# Patient Record
Sex: FEMALE | Race: OTHER | NOT HISPANIC OR LATINO
[De-identification: names, ages, dates, MRNs, and addresses within clinical notes are randomized per-mention and may not be internally consistent; named-entity substitution may affect disease eponyms.]

---

## 2017-01-09 ENCOUNTER — RESULT REVIEW (OUTPATIENT)
Age: 47
End: 2017-01-09

## 2017-02-22 ENCOUNTER — APPOINTMENT (OUTPATIENT)
Dept: BREAST CENTER | Facility: CLINIC | Age: 47
End: 2017-02-22

## 2017-08-09 ENCOUNTER — INPATIENT (INPATIENT)
Facility: HOSPITAL | Age: 47
LOS: 2 days | Discharge: HOME | DRG: 744 | End: 2017-08-12
Attending: OBSTETRICS & GYNECOLOGY | Admitting: OBSTETRICS & GYNECOLOGY
Payer: COMMERCIAL

## 2017-08-09 VITALS
WEIGHT: 199.96 LBS | RESPIRATION RATE: 18 BRPM | DIASTOLIC BLOOD PRESSURE: 65 MMHG | HEART RATE: 108 BPM | SYSTOLIC BLOOD PRESSURE: 112 MMHG | TEMPERATURE: 99 F | HEIGHT: 65 IN

## 2017-08-09 DIAGNOSIS — Z98.890 OTHER SPECIFIED POSTPROCEDURAL STATES: Chronic | ICD-10-CM

## 2017-08-09 LAB
ALBUMIN SERPL ELPH-MCNC: 3.4 G/DL — SIGNIFICANT CHANGE UP (ref 3.3–5)
ALP SERPL-CCNC: 120 U/L — SIGNIFICANT CHANGE UP (ref 40–120)
ALT FLD-CCNC: 20 U/L — SIGNIFICANT CHANGE UP (ref 10–45)
ANION GAP SERPL CALC-SCNC: 13 MMOL/L — SIGNIFICANT CHANGE UP (ref 5–17)
APPEARANCE UR: CLEAR — SIGNIFICANT CHANGE UP
APTT BLD: 25.3 SEC — LOW (ref 27.5–37.4)
AST SERPL-CCNC: 16 U/L — SIGNIFICANT CHANGE UP (ref 10–40)
BILIRUB SERPL-MCNC: 0.3 MG/DL — SIGNIFICANT CHANGE UP (ref 0.2–1.2)
BILIRUB UR-MCNC: NEGATIVE — SIGNIFICANT CHANGE UP
BLD GP AB SCN SERPL QL: NEGATIVE — SIGNIFICANT CHANGE UP
BUN SERPL-MCNC: 12 MG/DL — SIGNIFICANT CHANGE UP (ref 7–23)
CALCIUM SERPL-MCNC: 8.6 MG/DL — SIGNIFICANT CHANGE UP (ref 8.4–10.5)
CHLORIDE SERPL-SCNC: 98 MMOL/L — SIGNIFICANT CHANGE UP (ref 96–108)
CO2 SERPL-SCNC: 24 MMOL/L — SIGNIFICANT CHANGE UP (ref 22–31)
COLOR SPEC: YELLOW — SIGNIFICANT CHANGE UP
CREAT SERPL-MCNC: 1 MG/DL — SIGNIFICANT CHANGE UP (ref 0.5–1.3)
DIFF PNL FLD: (no result)
GLUCOSE SERPL-MCNC: 109 MG/DL — HIGH (ref 70–99)
GLUCOSE UR QL: NEGATIVE — SIGNIFICANT CHANGE UP
HCT VFR BLD CALC: 22.4 % — LOW (ref 34.5–45)
HCT VFR BLD CALC: 26.6 % — LOW (ref 34.5–45)
HGB BLD-MCNC: 6.8 G/DL — CRITICAL LOW (ref 11.5–15.5)
HGB BLD-MCNC: 8.6 G/DL — LOW (ref 11.5–15.5)
INR BLD: 0.97 — SIGNIFICANT CHANGE UP (ref 0.88–1.16)
KETONES UR-MCNC: (no result) MG/DL
LEUKOCYTE ESTERASE UR-ACNC: NEGATIVE — SIGNIFICANT CHANGE UP
MCHC RBC-ENTMCNC: 20.3 PG — LOW (ref 27–34)
MCHC RBC-ENTMCNC: 22.7 PG — LOW (ref 27–34)
MCHC RBC-ENTMCNC: 30.4 G/DL — LOW (ref 32–36)
MCHC RBC-ENTMCNC: 32.3 G/DL — SIGNIFICANT CHANGE UP (ref 32–36)
MCV RBC AUTO: 66.9 FL — LOW (ref 80–100)
MCV RBC AUTO: 70.2 FL — LOW (ref 80–100)
NITRITE UR-MCNC: NEGATIVE — SIGNIFICANT CHANGE UP
PH UR: 5.5 — SIGNIFICANT CHANGE UP (ref 5–8)
PLATELET # BLD AUTO: 470 K/UL — HIGH (ref 150–400)
PLATELET # BLD AUTO: 522 K/UL — HIGH (ref 150–400)
POTASSIUM SERPL-MCNC: 3.7 MMOL/L — SIGNIFICANT CHANGE UP (ref 3.5–5.3)
POTASSIUM SERPL-SCNC: 3.7 MMOL/L — SIGNIFICANT CHANGE UP (ref 3.5–5.3)
PROT SERPL-MCNC: 7.2 G/DL — SIGNIFICANT CHANGE UP (ref 6–8.3)
PROT UR-MCNC: NEGATIVE MG/DL — SIGNIFICANT CHANGE UP
PROTHROM AB SERPL-ACNC: 10.8 SEC — SIGNIFICANT CHANGE UP (ref 9.8–12.7)
RBC # BLD: 3.35 M/UL — LOW (ref 3.8–5.2)
RBC # BLD: 3.79 M/UL — LOW (ref 3.8–5.2)
RBC # FLD: 17.7 % — HIGH (ref 10.3–16.9)
RBC # FLD: 19.4 % — HIGH (ref 10.3–16.9)
RH IG SCN BLD-IMP: POSITIVE — SIGNIFICANT CHANGE UP
RH IG SCN BLD-IMP: POSITIVE — SIGNIFICANT CHANGE UP
SODIUM SERPL-SCNC: 135 MMOL/L — SIGNIFICANT CHANGE UP (ref 135–145)
SP GR SPEC: 1.02 — SIGNIFICANT CHANGE UP (ref 1–1.03)
TSH SERPL-MCNC: 2.12 UIU/ML — SIGNIFICANT CHANGE UP (ref 0.35–4.94)
UROBILINOGEN FLD QL: 0.2 E.U./DL — SIGNIFICANT CHANGE UP
WBC # BLD: 9 K/UL — SIGNIFICANT CHANGE UP (ref 3.8–10.5)
WBC # BLD: 9.3 K/UL — SIGNIFICANT CHANGE UP (ref 3.8–10.5)
WBC # FLD AUTO: 9 K/UL — SIGNIFICANT CHANGE UP (ref 3.8–10.5)
WBC # FLD AUTO: 9.3 K/UL — SIGNIFICANT CHANGE UP (ref 3.8–10.5)

## 2017-08-09 PROCEDURE — 76830 TRANSVAGINAL US NON-OB: CPT | Mod: 26

## 2017-08-09 PROCEDURE — 99285 EMERGENCY DEPT VISIT HI MDM: CPT | Mod: 25

## 2017-08-09 PROCEDURE — 76856 US EXAM PELVIC COMPLETE: CPT | Mod: 26

## 2017-08-09 RX ORDER — DIPHENHYDRAMINE HCL 50 MG
50 CAPSULE ORAL EVERY 6 HOURS
Qty: 0 | Refills: 0 | Status: DISCONTINUED | OUTPATIENT
Start: 2017-08-09 | End: 2017-08-10

## 2017-08-09 RX ORDER — SODIUM CHLORIDE 9 MG/ML
1000 INJECTION, SOLUTION INTRAVENOUS
Qty: 0 | Refills: 0 | Status: DISCONTINUED | OUTPATIENT
Start: 2017-08-09 | End: 2017-08-09

## 2017-08-09 NOTE — H&P ADULT - ATTENDING COMMENTS
PT SEEN AND EXAMINED.  I AGREE WITH ASSESSMENT AND PLAN AS DOCUMENTED.  DIAGNOSTIC TESTING AND LONG TERM MANAGEMENT OF DUB DISCUSSED.  PT CONSIDERING THERAPEUTIC OPTIONS.  WILL EVALUATE SYMPTOMS AFTER FIRST UNIT OF BLOOD.  IF STILL SYMPTOMATIC WILL ADMINISTER UNIT # 2.

## 2017-08-09 NOTE — ED ADULT NURSE NOTE - OBJECTIVE STATEMENT
Patient to the ED for heavy vaginal bleed associated with large clots beginning 6/26/17.  Patient reported that bleeding had lessened becoming pink and presently has worsened where clots are large and bleeding is extremely heavy saturating 10 to 15 tampon/pads in a small space of time.  Patient denies any abdominal pain, burning /pain on urination.

## 2017-08-09 NOTE — CONSULT NOTE ADULT - SUBJECTIVE AND OBJECTIVE BOX
46 yo  presents for heavy vaginal bleeding for several days. Patient reports irregular periods since January, previously had normal cycles. Bleeding was intermittent, irreg, light until recently when it became heavy, passing large dark stringy clots. Yesterday, she became dizzy, lightheaded, and had dark spots in her vision. Denies palpitations, SOB, nausea/vomiting, fever/chills. Hgb in February was 11. Patient has never had a pelvic sonogram, has no hx of fibroids, polyps, STDs.  Obhx: 1 FT , 1 MAB w/ D&C, VTOP D&C  GynHx: regular menses until January, then became irreg, no hx of STDs, fibroids, cysts, polyps  PMH: Asthma  PSH: D&C x2, sinus surgery  Meds: albuterol prn  NKDA  Fam Hx: mother had 53 fibroids    Afebrile  /65   Gen: NAD, AAOx3  Chest: normal work of breathing  Abdomen: soft, nontender, nondistended  Pelvic: normal cervix, <10 cc dark red blood                          6.8    9.0   )-----------( 522      ( 09 Aug 2017 05:26 )             22.4

## 2017-08-09 NOTE — ED PROVIDER NOTE - ATTENDING CONTRIBUTION TO CARE
pt seen and examined by me, agree with above. 48 yo female with persistent menses x 1 month, started feeling lightheaded, dizzy, past few days.  on exam, pt in nad, heart and lungs normal, abd soft nt.  hgb is low, will be transfused for symptomatic anemia, admitted to gyn

## 2017-08-09 NOTE — ED PROVIDER NOTE - MEDICAL DECISION MAKING DETAILS
GYN team with patient in ED ( aware was coming to ED ) GYN team with patient in ED ( aware was coming to ED ) + symptomatic anemia and per gyn requiring transfusion

## 2017-08-09 NOTE — ED PROVIDER NOTE - OBJECTIVE STATEMENT
persistent vaginal bleeding last two month albeit intermittent in nature + initially chalked up to perimenopausal but now concerned as some lightheadedness today along with clots Denies pain

## 2017-08-10 ENCOUNTER — RESULT REVIEW (OUTPATIENT)
Age: 47
End: 2017-08-10

## 2017-08-10 ENCOUNTER — TRANSCRIPTION ENCOUNTER (OUTPATIENT)
Age: 47
End: 2017-08-10

## 2017-08-10 LAB
HCT VFR BLD CALC: 27.6 % — LOW (ref 34.5–45)
HCT VFR BLD CALC: 30.8 % — LOW (ref 34.5–45)
HGB BLD-MCNC: 8.7 G/DL — LOW (ref 11.5–15.5)
HGB BLD-MCNC: 9.9 G/DL — LOW (ref 11.5–15.5)
MCHC RBC-ENTMCNC: 22.3 PG — LOW (ref 27–34)
MCHC RBC-ENTMCNC: 23 PG — LOW (ref 27–34)
MCHC RBC-ENTMCNC: 31.5 G/DL — LOW (ref 32–36)
MCHC RBC-ENTMCNC: 32.1 G/DL — SIGNIFICANT CHANGE UP (ref 32–36)
MCV RBC AUTO: 70.6 FL — LOW (ref 80–100)
MCV RBC AUTO: 71.6 FL — LOW (ref 80–100)
PLATELET # BLD AUTO: 450 K/UL — HIGH (ref 150–400)
PLATELET # BLD AUTO: 498 K/UL — HIGH (ref 150–400)
RBC # BLD: 3.91 M/UL — SIGNIFICANT CHANGE UP (ref 3.8–5.2)
RBC # BLD: 4.3 M/UL — SIGNIFICANT CHANGE UP (ref 3.8–5.2)
RBC # FLD: 19.5 % — HIGH (ref 10.3–16.9)
RBC # FLD: 19.9 % — HIGH (ref 10.3–16.9)
WBC # BLD: 11.6 K/UL — HIGH (ref 3.8–10.5)
WBC # BLD: 7.9 K/UL — SIGNIFICANT CHANGE UP (ref 3.8–10.5)
WBC # FLD AUTO: 11.6 K/UL — HIGH (ref 3.8–10.5)
WBC # FLD AUTO: 7.9 K/UL — SIGNIFICANT CHANGE UP (ref 3.8–10.5)

## 2017-08-10 RX ORDER — SODIUM CHLORIDE 9 MG/ML
1000 INJECTION, SOLUTION INTRAVENOUS
Qty: 0 | Refills: 0 | Status: DISCONTINUED | OUTPATIENT
Start: 2017-08-10 | End: 2017-08-10

## 2017-08-10 RX ORDER — IBUPROFEN 200 MG
1 TABLET ORAL
Qty: 0 | Refills: 0 | COMMUNITY
Start: 2017-08-10

## 2017-08-10 RX ORDER — KETOROLAC TROMETHAMINE 30 MG/ML
30 SYRINGE (ML) INJECTION ONCE
Qty: 0 | Refills: 0 | Status: DISCONTINUED | OUTPATIENT
Start: 2017-08-10 | End: 2017-08-12

## 2017-08-10 RX ORDER — MORPHINE SULFATE 50 MG/1
4 CAPSULE, EXTENDED RELEASE ORAL
Qty: 0 | Refills: 0 | Status: DISCONTINUED | OUTPATIENT
Start: 2017-08-10 | End: 2017-08-12

## 2017-08-10 RX ORDER — IBUPROFEN 200 MG
600 TABLET ORAL EVERY 6 HOURS
Qty: 0 | Refills: 0 | Status: DISCONTINUED | OUTPATIENT
Start: 2017-08-10 | End: 2017-08-12

## 2017-08-10 RX ORDER — SODIUM CHLORIDE 9 MG/ML
1000 INJECTION, SOLUTION INTRAVENOUS
Qty: 0 | Refills: 0 | Status: DISCONTINUED | OUTPATIENT
Start: 2017-08-10 | End: 2017-08-11

## 2017-08-10 RX ORDER — ONDANSETRON 8 MG/1
4 TABLET, FILM COATED ORAL ONCE
Qty: 0 | Refills: 0 | Status: COMPLETED | OUTPATIENT
Start: 2017-08-10 | End: 2017-08-10

## 2017-08-10 RX ORDER — FERROUS SULFATE 325(65) MG
325 TABLET ORAL EVERY 8 HOURS
Qty: 0 | Refills: 0 | Status: DISCONTINUED | OUTPATIENT
Start: 2017-08-10 | End: 2017-08-12

## 2017-08-10 RX ORDER — HYDROMORPHONE HYDROCHLORIDE 2 MG/ML
0.5 INJECTION INTRAMUSCULAR; INTRAVENOUS; SUBCUTANEOUS ONCE
Qty: 0 | Refills: 0 | Status: DISCONTINUED | OUTPATIENT
Start: 2017-08-10 | End: 2017-08-10

## 2017-08-10 RX ORDER — DOCUSATE SODIUM 100 MG
100 CAPSULE ORAL DAILY
Qty: 0 | Refills: 0 | Status: DISCONTINUED | OUTPATIENT
Start: 2017-08-10 | End: 2017-08-12

## 2017-08-10 RX ORDER — METOCLOPRAMIDE HCL 10 MG
10 TABLET ORAL EVERY 6 HOURS
Qty: 0 | Refills: 0 | Status: DISCONTINUED | OUTPATIENT
Start: 2017-08-10 | End: 2017-08-12

## 2017-08-10 RX ORDER — ACETAMINOPHEN 500 MG
650 TABLET ORAL EVERY 6 HOURS
Qty: 0 | Refills: 0 | Status: DISCONTINUED | OUTPATIENT
Start: 2017-08-10 | End: 2017-08-12

## 2017-08-10 RX ADMIN — HYDROMORPHONE HYDROCHLORIDE 0.5 MILLIGRAM(S): 2 INJECTION INTRAMUSCULAR; INTRAVENOUS; SUBCUTANEOUS at 10:35

## 2017-08-10 RX ADMIN — SODIUM CHLORIDE 125 MILLILITER(S): 9 INJECTION, SOLUTION INTRAVENOUS at 12:42

## 2017-08-10 RX ADMIN — HYDROMORPHONE HYDROCHLORIDE 0.5 MILLIGRAM(S): 2 INJECTION INTRAMUSCULAR; INTRAVENOUS; SUBCUTANEOUS at 10:53

## 2017-08-10 RX ADMIN — Medication 10 MILLIGRAM(S): at 12:24

## 2017-08-10 RX ADMIN — Medication 600 MILLIGRAM(S): at 23:00

## 2017-08-10 RX ADMIN — Medication 100 MILLIGRAM(S): at 13:42

## 2017-08-10 RX ADMIN — MORPHINE SULFATE 4 MILLIGRAM(S): 50 CAPSULE, EXTENDED RELEASE ORAL at 11:00

## 2017-08-10 RX ADMIN — Medication 600 MILLIGRAM(S): at 23:32

## 2017-08-10 RX ADMIN — Medication 325 MILLIGRAM(S): at 22:50

## 2017-08-10 RX ADMIN — ONDANSETRON 4 MILLIGRAM(S): 8 TABLET, FILM COATED ORAL at 20:20

## 2017-08-10 NOTE — DISCHARGE NOTE ADULT - CARE PROVIDER_API CALL
Genevieve Tineo), Obstetrics and Gynecology  [address] [address]  Phone: [phone]  Fax: [phone]

## 2017-08-10 NOTE — DISCHARGE NOTE ADULT - PATIENT PORTAL LINK FT
“You can access the [employer] Patient Portal, offered by [facility], by registering with the following website: [email or web address]”

## 2017-08-10 NOTE — DISCHARGE NOTE ADULT - CARE PLAN
Principal Discharge DX:	Iron deficiency anemia due to chronic blood loss  Goal:	be happy  Instructions for follow-up, activity and diet:	resume activities of daily living, no sexual intercourse, f/u with Dr. Tineo in 1-2 weeks

## 2017-08-10 NOTE — DISCHARGE NOTE ADULT - HOSPITAL COURSE
Pt is a 46 y/o admitted with symptomatic anemia secondary to uterine fibroids. Patient received 3 units of blood with appropriate increase during admission. Hospital day 2 patient went to the OR for diagnostic hysteroscopy and D&C for thickened endometrial lining and suspected endometrial polyp. Uncomplicated surgery. Patient discharged in stable condition with plan for close outpatient monitoring.

## 2017-08-10 NOTE — DISCHARGE NOTE ADULT - MEDICATION SUMMARY - MEDICATIONS TO TAKE
I will START or STAY ON the medications listed below when I get home from the hospital:    ibuprofen 600 mg oral tablet  -- 1 tab(s) by mouth every 6 hours  -- Indication: For Vaginal bleeding

## 2017-08-11 LAB
HCT VFR BLD CALC: 29.5 % — LOW (ref 34.5–45)
HGB BLD-MCNC: 9.4 G/DL — LOW (ref 11.5–15.5)
MCHC RBC-ENTMCNC: 22.9 PG — LOW (ref 27–34)
MCHC RBC-ENTMCNC: 31.9 G/DL — LOW (ref 32–36)
MCV RBC AUTO: 71.8 FL — LOW (ref 80–100)
PLATELET # BLD AUTO: 449 K/UL — HIGH (ref 150–400)
RBC # BLD: 4.11 M/UL — SIGNIFICANT CHANGE UP (ref 3.8–5.2)
RBC # FLD: 20.2 % — HIGH (ref 10.3–16.9)
SURGICAL PATHOLOGY STUDY: SIGNIFICANT CHANGE UP
WBC # BLD: 16.5 K/UL — HIGH (ref 3.8–10.5)
WBC # FLD AUTO: 16.5 K/UL — HIGH (ref 3.8–10.5)

## 2017-08-11 PROCEDURE — 93010 ELECTROCARDIOGRAM REPORT: CPT

## 2017-08-11 RX ORDER — HYDROCORTISONE 1 %
1 OINTMENT (GRAM) TOPICAL EVERY 8 HOURS
Qty: 0 | Refills: 0 | Status: DISCONTINUED | OUTPATIENT
Start: 2017-08-11 | End: 2017-08-12

## 2017-08-11 RX ORDER — ZINC OXIDE 200 MG/G
1 OINTMENT TOPICAL EVERY 8 HOURS
Qty: 0 | Refills: 0 | Status: DISCONTINUED | OUTPATIENT
Start: 2017-08-11 | End: 2017-08-12

## 2017-08-11 RX ORDER — OXYMETAZOLINE HYDROCHLORIDE 0.5 MG/ML
1 SPRAY NASAL EVERY 12 HOURS
Qty: 0 | Refills: 0 | Status: DISCONTINUED | OUTPATIENT
Start: 2017-08-11 | End: 2017-08-12

## 2017-08-11 RX ADMIN — MORPHINE SULFATE 4 MILLIGRAM(S): 50 CAPSULE, EXTENDED RELEASE ORAL at 16:10

## 2017-08-11 RX ADMIN — Medication 600 MILLIGRAM(S): at 08:18

## 2017-08-11 RX ADMIN — Medication 325 MILLIGRAM(S): at 08:18

## 2017-08-11 RX ADMIN — Medication 1 APPLICATION(S): at 13:53

## 2017-08-11 RX ADMIN — Medication 325 MILLIGRAM(S): at 13:51

## 2017-08-11 RX ADMIN — ZINC OXIDE 1 APPLICATION(S): 200 OINTMENT TOPICAL at 11:08

## 2017-08-11 RX ADMIN — MORPHINE SULFATE 4 MILLIGRAM(S): 50 CAPSULE, EXTENDED RELEASE ORAL at 15:40

## 2017-08-11 RX ADMIN — Medication 600 MILLIGRAM(S): at 08:28

## 2017-08-11 RX ADMIN — Medication 325 MILLIGRAM(S): at 22:48

## 2017-08-11 RX ADMIN — Medication 100 MILLIGRAM(S): at 09:48

## 2017-08-11 NOTE — CONSULT NOTE ADULT - SUBJECTIVE AND OBJECTIVE BOX
HPI:  46 yo  presents for heavy vaginal bleeding for several days. Patient reports irregular periods since January, previously had normal cycles. Bleeding was intermittent, irreg, light until recently when it became heavy, passing large dark stringy clots. Taken to OR for diagnostic hysteroscopy and D&C for thickened endometrial lining and suspected endometrial polyp. Uncomplicated surgery. Received 3 U of PRBCs during hospital course. Uneventful. She had mild chest pain, atypical for the past day. It has not required any analgesic treatment. Denies sharp pain, any changes with respiration, hemoptysis, n/v, palpitations, diaphoresis.     Cardiology consult requested for atypical chest discomfort. EKG showing TWI in III, aVF.                        6.8    9.0   )-----------( 522      ( 09 Aug 2017 05:26 )             22.4 (09 Aug 2017 08:59)      PAST MEDICAL & SURGICAL HISTORY:  Uncomplicated asthma, unspecified asthma severity  H/O sinus surgery  H/O dilation and curettage      MEDICATIONS  (STANDING):  ibuprofen  Tablet 600 milliGRAM(s) Oral every 6 hours  ferrous    sulfate 325 milliGRAM(s) Oral every 8 hours  docusate sodium 100 milliGRAM(s) Oral daily    MEDICATIONS  (PRN):  acetaminophen   Tablet. 650 milliGRAM(s) Oral every 6 hours PRN Mild Pain (1 - 3)  ketorolac   Injectable 30 milliGRAM(s) IV Push once PRN Moderate Pain  morphine  - Injectable 4 milliGRAM(s) IV Push every 15 minutes PRN Severe Pain  metoclopramide Injectable 10 milliGRAM(s) IV Push every 6 hours PRN Nausea and/or Vomiting  zinc oxide 11.3% Cream 1 Application(s) Topical every 8 hours PRN Skin care  hydrocortisone 0.5% Cream 1 Application(s) Topical every 8 hours PRN Rash and/or Itching      CARDIAC DIAGNOSTIC TESTING:        FAMILY HISTORY:  Family history of uterine fibroid (Mother)      SOCIAL HISTORY:  - Smoking:  - Alcohol:  - Recreational drug use:  - Other:    REVIEW OF SYSTEMS:  9 point ROS negative, unless stated in HPI    Vitals past 24 Hours: T(C): 36.3 (17 @ 13:31), Max: 37.3 (17 @ 00:44)  HR: 79 (17 @ 13:31) (79 - 99)  BP: 110/74 (17 @ 13:31) (96/60 - 127/77)  RR: 18 (17 @ 13:31) (16 - 18)  SpO2: 97% (17 @ 13:31) (97% - 100%)	    PHYSICAL EXAM:  GEN: No acute respiratory distress, appears stated age	  HEENT: Anicteric sclera, PERRL, EOMI, no oropharyngeal erythema or discharge, trachea midline  Lymphatic: No cervical lymphadenopathy  Cardiovascular: S1 S2, No JVD, No murmurs, PMI  Respiratory: Lungs clear to auscultation, no rrw  Gastrointestinal:  BS+, soft, non distended, non tender, no HSM  Skin: No rashes, No ecchymoses, No cyanosis  Neurologic: Non-focal, AAO x 3, strength grossly normal in all extremeties  Extremities: Normal range of motion, No clubbing, cyanosis or edema  Vascular: Radial 2+, DP 2+      I&O's Detail    10 Aug 2017 07:  -  11 Aug 2017 07:00  --------------------------------------------------------  IN:    lactated ringers.: 1375 mL    Oral Fluid: 390 mL  Total IN: 1765 mL    OUT:    Voided: 1725 mL  Total OUT: 1725 mL    Total NET: 40 mL      11 Aug 2017 07:  -  11 Aug 2017 15:45  --------------------------------------------------------  IN:    Oral Fluid: 720 mL  Total IN: 720 mL    OUT:    Voided: 1800 mL  Total OUT: 1800 mL    Total NET: -1080 mL          LABS:                        9.4    16.5  )-----------( 449      ( 11 Aug 2017 07:07 )             29.5                   I&O's Summary    10 Aug 2017 07:  -  11 Aug 2017 07:00  --------------------------------------------------------  IN: 1765 mL / OUT: 1725 mL / NET: 40 mL    11 Aug 2017 07:  -  11 Aug 2017 15:45  --------------------------------------------------------  IN: 720 mL / OUT: 1800 mL / NET: -1080 mL        RADIOLOGY & ADDITIONAL STUDIES: HPI:  48 yo  presents for heavy vaginal bleeding for several days. Patient reports irregular periods since January, previously had normal cycles. Bleeding was intermittent, irreg, light until recently when it became heavy, passing large dark stringy clots. Taken to OR for diagnostic hysteroscopy and D&C for thickened endometrial lining and suspected endometrial polyp. Uncomplicated surgery. Received 3 U of PRBCs during hospital course. Uneventful. She had mild chest pain, atypical for the past day. It has not required any analgesic treatment. Denies sharp pain, any changes with respiration, hemoptysis, n/v, palpitations, diaphoresis.     Cardiology consult requested for atypical chest discomfort. EKG showing TWI in III, aVF.                        6.8    9.0   )-----------( 522      ( 09 Aug 2017 05:26 )             22.4 (09 Aug 2017 08:59)      PAST MEDICAL & SURGICAL HISTORY:  Uncomplicated asthma, unspecified asthma severity  H/O sinus surgery  H/O dilation and curettage      MEDICATIONS  (STANDING):  ibuprofen  Tablet 600 milliGRAM(s) Oral every 6 hours  ferrous    sulfate 325 milliGRAM(s) Oral every 8 hours  docusate sodium 100 milliGRAM(s) Oral daily    MEDICATIONS  (PRN):  acetaminophen   Tablet. 650 milliGRAM(s) Oral every 6 hours PRN Mild Pain (1 - 3)  ketorolac   Injectable 30 milliGRAM(s) IV Push once PRN Moderate Pain  morphine  - Injectable 4 milliGRAM(s) IV Push every 15 minutes PRN Severe Pain  metoclopramide Injectable 10 milliGRAM(s) IV Push every 6 hours PRN Nausea and/or Vomiting  zinc oxide 11.3% Cream 1 Application(s) Topical every 8 hours PRN Skin care  hydrocortisone 0.5% Cream 1 Application(s) Topical every 8 hours PRN Rash and/or Itching      FAMILY HISTORY:  Family history of uterine fibroid (Mother)  Family history of grandmother with MI late 60s  2 second cousins with PE    SOCIAL HISTORY:  Non smoker  social alcohol consumption    REVIEW OF SYSTEMS:  9 point ROS negative, unless stated in HPI    Vitals past 24 Hours: T(C): 36.3 (17 @ 13:31), Max: 37.3 (17 @ 00:44)  HR: 79 (17 @ 13:31) (79 - 99)  BP: 110/74 (17 @ 13:31) (96/60 - 127/77)  RR: 18 (17 @ 13:31) (16 - 18)  SpO2: 97% (17 @ 13:31) (97% - 100%)	    PHYSICAL EXAM:  GEN: No acute respiratory distress, appears stated age	  HEENT: Anicteric sclera, PERRL, EOMI, no oropharyngeal erythema or discharge, trachea midline  Lymphatic: No cervical lymphadenopathy  Cardiovascular: S1 S2, No JVD, No murmurs, no pain on palpation of CW  Respiratory: Lungs clear to auscultation, no rrw  Gastrointestinal:  BS+, soft, non distended, non tender, no HSM  Skin: No rashes, No ecchymoses, No cyanosis  Neurologic: Non-focal, AAO x 3, strength grossly normal in all extremeties  Extremities: Normal range of motion, No clubbing, cyanosis or edema  Vascular: Radial 2+, DP 2+      I&O's Detail    10 Aug 2017 07:  -  11 Aug 2017 07:00  --------------------------------------------------------  IN:    lactated ringers.: 1375 mL    Oral Fluid: 390 mL  Total IN: 1765 mL    OUT:    Voided: 1725 mL  Total OUT: 1725 mL    Total NET: 40 mL      11 Aug 2017 07:  -  11 Aug 2017 15:45  --------------------------------------------------------  IN:    Oral Fluid: 720 mL  Total IN: 720 mL    OUT:    Voided: 1800 mL  Total OUT: 1800 mL    Total NET: -1080 mL          LABS:                        9.4    16.5  )-----------( 449      ( 11 Aug 2017 07:07 )             29.5                   I&O's Summary    10 Aug 2017 07:  -  11 Aug 2017 07:00  --------------------------------------------------------  IN: 1765 mL / OUT: 1725 mL / NET: 40 mL    11 Aug 2017 07:01  -  11 Aug 2017 15:45  --------------------------------------------------------  IN: 720 mL / OUT: 1800 mL / NET: -1080 mL        RADIOLOGY & ADDITIONAL STUDIES:

## 2017-08-11 NOTE — CONSULT NOTE ADULT - ASSESSMENT
46 yo F w/ symptomatic anemia 2/2 heavy and irregular vaginal bleeding which has worsened over past 24 hrs.   -Patient tachycardic and symptomatic, complains of dizziness, weakness, spots in vision. Admit for blood transfusion.  -Transvaginal ultrasound  -Reg diet  -Voids  -Plan to discharge in afternoon if stable    Discussed with Dr Tineo.
48 yo  presents for heavy vaginal bleeding for several days s/p Cardiology consult requested for atypical chest discomfort. EKG showing TWI in III, aVF.    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 She reports she had a holter monitor 6-7 years ago, normal. Symptoms do not suggest anginal or valvular pathology.  Recommend D-Dimer, if negative no further cardiac workup. EKG can be a normal variant. Recommend follow up with PMD for EKG abnormality.    Case discussed with Dr. Clinton

## 2017-08-12 VITALS
SYSTOLIC BLOOD PRESSURE: 116 MMHG | TEMPERATURE: 98 F | OXYGEN SATURATION: 97 % | HEART RATE: 84 BPM | RESPIRATION RATE: 16 BRPM | DIASTOLIC BLOOD PRESSURE: 77 MMHG

## 2017-08-12 LAB
D DIMER BLD IA.RAPID-MCNC: 502 NG/ML DDU — HIGH
HCT VFR BLD CALC: 30.4 % — LOW (ref 34.5–45)
HGB BLD-MCNC: 9.6 G/DL — LOW (ref 11.5–15.5)
MCHC RBC-ENTMCNC: 23.3 PG — LOW (ref 27–34)
MCHC RBC-ENTMCNC: 31.6 G/DL — LOW (ref 32–36)
MCV RBC AUTO: 73.8 FL — LOW (ref 80–100)
PLATELET # BLD AUTO: 481 K/UL — HIGH (ref 150–400)
RBC # BLD: 4.12 M/UL — SIGNIFICANT CHANGE UP (ref 3.8–5.2)
RBC # FLD: 22 % — HIGH (ref 10.3–16.9)
WBC # BLD: 12 K/UL — HIGH (ref 3.8–10.5)
WBC # FLD AUTO: 12 K/UL — HIGH (ref 3.8–10.5)

## 2017-08-12 RX ADMIN — Medication 325 MILLIGRAM(S): at 06:09

## 2017-08-12 RX ADMIN — Medication 325 MILLIGRAM(S): at 13:35

## 2017-08-12 RX ADMIN — OXYMETAZOLINE HYDROCHLORIDE 1 SPRAY(S): 0.5 SPRAY NASAL at 06:10

## 2017-08-12 RX ADMIN — Medication 100 MILLIGRAM(S): at 12:24

## 2017-08-12 NOTE — PROGRESS NOTE ADULT - SUBJECTIVE AND OBJECTIVE BOX
Patient seen at bedside. No acute events overnight. She completed her transfusion in the early evening and felt a little better, still felt weak, less dizzy though. Denies SOB, palpitations. Continues to have heavy vaginal bleeding. Denies nausea/vomiting, fever/chills, chest pain.    Hgb 8.6 post transfusion, patient added on to the OR schedule for D&C hysteroscopy, polypectomy vs myomectomy.     Vitals  Afebrile   /79  HR 89  Gen: NAD, AO x3  Chest: normal work of breathing, RRR  Abdomen: soft, nontender, nondistended, no rebound or guarding, normal bowel sounds  Extremities: no calf tenderness or erythema                          8.6    9.3   )-----------( 470      ( 09 Aug 2017 22:59 )             26.6
Pt seen and examined at bedside. Pt states mild abdominal pain controlled with Motrin. Pt is ambulating, tolerating regular diet, and passing flatus. Pt states feeling some lightheadedness when ambulating the hallway.         Pt denies fever, chills, chest pain, SOB, nausea, or vomiting.     T(F): 97.9 (08-11-17 @ 16:48), Max: 99.2 (08-11-17 @ 00:44)  HR: 70 (08-11-17 @ 16:48) (70 - 99)  BP: 129/86 (08-11-17 @ 16:48) (110/74 - 129/86)  RR: 18 (08-11-17 @ 16:48) (16 - 18)  SpO2: 97% (08-11-17 @ 16:48) (97% - 100%)  Wt(kg): --  I&O's Summary    10 Aug 2017 07:01  -  11 Aug 2017 07:00  --------------------------------------------------------  IN: 1765 mL / OUT: 1725 mL / NET: 40 mL    11 Aug 2017 07:01  -  11 Aug 2017 17:19  --------------------------------------------------------  IN: 720 mL / OUT: 1800 mL / NET: -1080 mL    MEDICATIONS  (STANDING):  ibuprofen  Tablet 600 milliGRAM(s) Oral every 6 hours  ferrous    sulfate 325 milliGRAM(s) Oral every 8 hours  docusate sodium 100 milliGRAM(s) Oral daily    MEDICATIONS  (PRN):  acetaminophen   Tablet. 650 milliGRAM(s) Oral every 6 hours PRN Mild Pain (1 - 3)  ketorolac   Injectable 30 milliGRAM(s) IV Push once PRN Moderate Pain  morphine  - Injectable 4 milliGRAM(s) IV Push every 15 minutes PRN Severe Pain  metoclopramide Injectable 10 milliGRAM(s) IV Push every 6 hours PRN Nausea and/or Vomiting  zinc oxide 11.3% Cream 1 Application(s) Topical every 8 hours PRN Skin care  hydrocortisone 0.5% Cream 1 Application(s) Topical every 8 hours PRN Rash and/or Itching    Physical Exam:  Constitutional: NAD  Pulmonary: clear to auscultation bilaterally   Cardiovascular: Regular rate and rhythm   Abdomen: incision site clean, dry, intact. Soft, mildly tender, nondistended, no guarding, no rebound, +bowel sounds  Extremities: no lower extremity edema or calve tenderness. SCDs in place     LABS:                        9.4    16.5  )-----------( 449      ( 11 Aug 2017 07:07 )             29.5       RADIOLOGY & ADDITIONAL TESTS:    **Preliminary Report**     Ventricular Rate 95 BPM    Atrial Rate 95 BPM    P-R Interval 144 ms    QRS Duration 88 ms    Q-T Interval 350 ms    QTC Calculation(Bezet) 439 ms    P Axis 62 degrees    R Axis 64 degrees    T Axis -8 degrees    Diagnosis Line Normal sinus rhythm  Abnormal QRS-T angle, consider primary T wave abnormality
Pt seen at bedside for AM rounds.  No acute events overnight.  Was seen by cardiology due to complaint of chest discomfort, cleared by cardiology with EKG showing normal sinus rhythm, possible T wave abnormality though not concerning for any acute issue.  Recommended d-dimer; pending collection.    She reports that she is feeling well today and has no complaints.  Mild discomfort controlled with OPM.  Felt some dizziness yesterday evening when ambulating down [address], has not felt any lightheadedness/weakness/dizziness/chest pain/palpitations since that time.  She is ambulating spontaneously, passing gas, voiding without issue.  Vaginal bleeding stable, used 3-4 pads overnight.      ICU Vital Signs Last 24 Hrs  T(C): 36.8 (12 Aug 2017 09:43), Max: 36.8 (12 Aug 2017 09:43)  T(F): 98.3 (12 Aug 2017 09:43), Max: 98.3 (12 Aug 2017 09:43)  HR: 69 (12 Aug 2017 09:43) (69 - 79)  BP: 115/81 (12 Aug 2017 09:43) (110/74 - 129/86)  BP(mean): --  ABP: --  ABP(mean): --  RR: 16 (12 Aug 2017 09:43) (16 - 18)  SpO2: 98% (12 Aug 2017 09:43) (97% - 100%)    Gen: NAD, AAOx3  CV: RRR, S1S2  Pulm: CTAB  Abd soft, nontender    MEDICATIONS  (STANDING):  ibuprofen  Tablet 600 milliGRAM(s) Oral every 6 hours  ferrous    sulfate 325 milliGRAM(s) Oral every 8 hours  docusate sodium 100 milliGRAM(s) Oral daily  oxymetazoline 0.05% Nasal Spray 1 Spray(s) Both Nostrils every 12 hours    MEDICATIONS  (PRN):  acetaminophen   Tablet. 650 milliGRAM(s) Oral every 6 hours PRN Mild Pain (1 - 3)  ketorolac   Injectable 30 milliGRAM(s) IV Push once PRN Moderate Pain  morphine  - Injectable 4 milliGRAM(s) IV Push every 15 minutes PRN Severe Pain  metoclopramide Injectable 10 milliGRAM(s) IV Push every 6 hours PRN Nausea and/or Vomiting  zinc oxide 11.3% Cream 1 Application(s) Topical every 8 hours PRN Skin care  hydrocortisone 0.5% Cream 1 Application(s) Topical every 8 hours PRN Rash and/or Itching
Subjective: Pain is adequately controlled. She said that the abdominal cramping is manageable. Patient is able to ambulate to the restroom without issue. She continues to have vaginal bleeding, but it is significantly decreased since preoperatively. She is tolerating sips.     Patient denies fevers, chills, chest pain, shortness of breath, nausea, vomiting, diarrhea or constipation     Vital Signs Last 24 Hrs  T(C): 36.6 (10 Aug 2017 12:04), Max: 37.4 (09 Aug 2017 21:10)  T(F): 97.8 (10 Aug 2017 12:04), Max: 99.3 (09 Aug 2017 21:10)  HR: 77 (10 Aug 2017 12:) (72 - 98)  BP: 114/68 (10 Aug 2017 12:04) (109/72 - 133/54)  BP(mean): --  RR: 16 (10 Aug 2017 12:04) (16 - 22)  SpO2: 100% (10 Aug 2017 12:04) (98% - 100%)  I&O's Summary    09 Aug 2017 07:01  -  10 Aug 2017 07:00  --------------------------------------------------------  IN: 1350 mL / OUT: 0 mL / NET: 1350 mL    10 Aug 2017 07:01  -  10 Aug 2017 17:05  --------------------------------------------------------  IN: 0 mL / OUT: 300 mL / NET: -300 mL      MEDICATIONS  (STANDING):  ibuprofen  Tablet 600 milliGRAM(s) Oral every 6 hours  ferrous    sulfate 325 milliGRAM(s) Oral every 8 hours  lactated ringers. 1000 milliLiter(s) (125 mL/Hr) IV Continuous <Continuous>  docusate sodium 100 milliGRAM(s) Oral daily    MEDICATIONS  (PRN):  acetaminophen   Tablet. 650 milliGRAM(s) Oral every 6 hours PRN Mild Pain (1 - 3)  ketorolac   Injectable 30 milliGRAM(s) IV Push once PRN Moderate Pain  morphine  - Injectable 4 milliGRAM(s) IV Push every 15 minutes PRN Severe Pain  metoclopramide Injectable 10 milliGRAM(s) IV Push every 6 hours PRN Nausea and/or Vomiting  ondansetron Injectable 4 milliGRAM(s) IV Push once PRN Postoperative Nausea and/or Vomiting      PHYSICAL EXAM   Constitutional: Alert & Oriented x3, No acute distress, cooperative   Gastrointestinal: obese, soft, non tender, positive bowel sounds, no rebound or guarding   Extremities: no calf tenderness or swelling    LABS:                        8.7    7.9   )-----------( 450      ( 10 Aug 2017 07:35 )             27.6     08-    135  |  98  |  12  ----------------------------<  109<H>  3.7   |  24  |  1.00    Ca    8.6      09 Aug 2017 05:35  Mg     2.2     -    TPro  7.2  /  Alb  3.4  /  TBili  0.3  /  DBili  x   /  AST  16  /  ALT  20  /  AlkPhos  120  08-09    PT/INR - ( 09 Aug 2017 05:26 )   PT: 10.8 sec;   INR: 0.97          PTT - ( 09 Aug 2017 05:26 )  PTT:25.3 sec  Urinalysis Basic - ( 09 Aug 2017 07:03 )    Color: Yellow / Appearance: Clear / S.020 / pH: x  Gluc: x / Ketone: Trace mg/dL  / Bili: NEGATIVE / Urobili: 0.2 E.U./dL   Blood: x / Protein: NEGATIVE mg/dL / Nitrite: NEGATIVE   Leuk Esterase: NEGATIVE / RBC: < 5 /HPF / WBC < 5 /HPF   Sq Epi: x / Non Sq Epi: Few /HPF / Bacteria: Present /HPF
Pt evaluated at bedside.  She still reports some dizziness when she stands up but is comfortable in bed. She is not experiencing any pain. She does have vaginal bleeding still with clots. Pt states the bleeding is the same.   She denies HA, SOB, CP, palpitations, n/v.    T(C): 36.7 (08-09-17 @ 16:10), Max: 36.8 (08-09-17 @ 06:43)  HR: 92 (08-09-17 @ 16:10) (84 - 92)  BP: 127/68 (08-09-17 @ 16:10) (115/63 - 129/80)  RR: 16 (08-09-17 @ 16:10) (16 - 18)  SpO2: 100% (08-09-17 @ 16:10) (98% - 100%)    GA: A&Ox3  CV: RRR, no MRG  Pulm: CTAB  Abd: +BS, soft, NTND, no rebound or guarding  Extrem: SCDs in place    08-09 @ 07:01  -  08-09 @ 16:45  --------------------------------------------------------  IN: 375 mL / OUT: 0 mL / NET: 375 mL                          6.8    9.0   )-----------( 522      ( 09 Aug 2017 05:26 )             22.4     08-09    135  |  98  |  12  ----------------------------<  109<H>  3.7   |  24  |  1.00    Ca    8.6      09 Aug 2017 05:35  Mg     2.2     08-09    TPro  7.2  /  Alb  3.4  /  TBili  0.3  /  DBili  x   /  AST  16  /  ALT  20  /  AlkPhos  120  08-09

## 2017-08-12 NOTE — PROGRESS NOTE ADULT - ASSESSMENT
46 yo F w/ symptomatic anemia 2/2 to heavy and irregular vaginal bleeding.  -Patient symptomatic without tachycardia currently. Received one unit of blood, on second unit  -Transvaginal ultrasound showed possible polyp   -Reg diet  -Voids currently   -Plan for possible D&C per pt request and to repeat cbc at 10pm    D/w Dr. Tineo
48 y/o POD0 after diagnostic hysteroscopy, D&C for thickened endometrium and heavy vaginal bleeding. Patient is currently asymptomatically anemic. Repeat CBC at 1800. VSS.   - anticipate d/c tomorrow morning  - regular diet  - ambulate as tolerated
48 yo F w/ symptomatic anemia 2/2 to heavy and irregular vaginal bleeding.  -Patient s/p 2 u pRBC, still with heavy vaginal bleeding. Added on for D&C hysteroscopy w/ myosure, possible polypectomy vs myomectomy.  -NPO for procedure  -F/u am CBC  -Voiding    D/w Dr. Tineo
48yo POD1 s/p dilation and curettage and hysteroscopy for symptomatic anemia and vaginal bleeding. Pt is hemodynamically stable.   1. VSS- continue to monitor per protocol  2. Pain control with Motrin or Tylenol as needed   3. DVT ppx: SCDs  4. Cardio- EKG  reveals NSR, Abnormal QRS-T angle, consider primary T wave abnormality. follow up final EKG results. Follow up cardiology recommendations, d-dimer  4. Pulm: incentive spirometer   5. GI: Diet regular   6. Activity ambulate as tolerated
48yo POD1 s/p dilation and curettage and hysteroscopy for symptomatic anemia and vaginal bleeding. Pt is hemodynamically stable.   1. VSS- continue to monitor per protocol  2. Pain control with Motrin or Tylenol as needed   3. DVT ppx: SCDs  4. Cardio- EKG  reveals NSR, Abnormal QRS-T angle, consider primary T wave abnormality. follow up final EKG results. Follow up cardiology recommendations, d-dimer  4. Pulm: incentive spirometer   5. GI: Diet regular   6. Activity ambulate as tolerated

## 2017-08-14 PROCEDURE — 36430 TRANSFUSION BLD/BLD COMPNT: CPT

## 2017-08-14 PROCEDURE — 76830 TRANSVAGINAL US NON-OB: CPT

## 2017-08-14 PROCEDURE — 86923 COMPATIBILITY TEST ELECTRIC: CPT

## 2017-08-14 PROCEDURE — 88305 TISSUE EXAM BY PATHOLOGIST: CPT

## 2017-08-14 PROCEDURE — 85730 THROMBOPLASTIN TIME PARTIAL: CPT

## 2017-08-14 PROCEDURE — 81001 URINALYSIS AUTO W/SCOPE: CPT

## 2017-08-14 PROCEDURE — 86850 RBC ANTIBODY SCREEN: CPT

## 2017-08-14 PROCEDURE — 85027 COMPLETE CBC AUTOMATED: CPT

## 2017-08-14 PROCEDURE — 82310 ASSAY OF CALCIUM: CPT

## 2017-08-14 PROCEDURE — P9016: CPT

## 2017-08-14 PROCEDURE — 86900 BLOOD TYPING SEROLOGIC ABO: CPT

## 2017-08-14 PROCEDURE — 84443 ASSAY THYROID STIM HORMONE: CPT

## 2017-08-14 PROCEDURE — 83735 ASSAY OF MAGNESIUM: CPT

## 2017-08-14 PROCEDURE — 76856 US EXAM PELVIC COMPLETE: CPT

## 2017-08-14 PROCEDURE — 99285 EMERGENCY DEPT VISIT HI MDM: CPT | Mod: 25

## 2017-08-14 PROCEDURE — 85610 PROTHROMBIN TIME: CPT

## 2017-08-14 PROCEDURE — 85379 FIBRIN DEGRADATION QUANT: CPT

## 2017-08-14 PROCEDURE — 36415 COLL VENOUS BLD VENIPUNCTURE: CPT

## 2017-08-14 PROCEDURE — 80053 COMPREHEN METABOLIC PANEL: CPT

## 2017-08-14 PROCEDURE — 86901 BLOOD TYPING SEROLOGIC RH(D): CPT

## 2017-08-14 PROCEDURE — 93005 ELECTROCARDIOGRAM TRACING: CPT

## 2017-08-14 PROCEDURE — 84702 CHORIONIC GONADOTROPIN TEST: CPT

## 2017-08-15 DIAGNOSIS — N84.0 POLYP OF CORPUS UTERI: ICD-10-CM

## 2017-08-15 DIAGNOSIS — Z91.048 OTHER NONMEDICINAL SUBSTANCE ALLERGY STATUS: ICD-10-CM

## 2017-08-15 DIAGNOSIS — E66.9 OBESITY, UNSPECIFIED: ICD-10-CM

## 2017-08-15 DIAGNOSIS — J45.909 UNSPECIFIED ASTHMA, UNCOMPLICATED: ICD-10-CM

## 2017-08-15 DIAGNOSIS — Z82.49 FAMILY HISTORY OF ISCHEMIC HEART DISEASE AND OTHER DISEASES OF THE CIRCULATORY SYSTEM: ICD-10-CM

## 2017-08-15 DIAGNOSIS — D62 ACUTE POSTHEMORRHAGIC ANEMIA: ICD-10-CM

## 2017-08-15 DIAGNOSIS — Z78.0 ASYMPTOMATIC MENOPAUSAL STATE: ICD-10-CM

## 2017-08-15 DIAGNOSIS — N93.9 ABNORMAL UTERINE AND VAGINAL BLEEDING, UNSPECIFIED: ICD-10-CM

## 2017-08-17 DIAGNOSIS — N85.00 ENDOMETRIAL HYPERPLASIA, UNSPECIFIED: ICD-10-CM

## 2017-09-28 ENCOUNTER — RESULT REVIEW (OUTPATIENT)
Age: 47
End: 2017-09-28

## 2017-09-28 ENCOUNTER — INPATIENT (INPATIENT)
Facility: HOSPITAL | Age: 47
LOS: 8 days | Discharge: HOME | DRG: 742 | End: 2017-10-07
Attending: OBSTETRICS & GYNECOLOGY | Admitting: OBSTETRICS & GYNECOLOGY
Payer: COMMERCIAL

## 2017-09-28 VITALS
WEIGHT: 190.04 LBS | DIASTOLIC BLOOD PRESSURE: 89 MMHG | OXYGEN SATURATION: 100 % | HEIGHT: 65 IN | RESPIRATION RATE: 18 BRPM | TEMPERATURE: 98 F | HEART RATE: 112 BPM | SYSTOLIC BLOOD PRESSURE: 148 MMHG

## 2017-09-28 DIAGNOSIS — Z98.890 OTHER SPECIFIED POSTPROCEDURAL STATES: Chronic | ICD-10-CM

## 2017-09-28 LAB
ALBUMIN SERPL ELPH-MCNC: 4.1 G/DL — SIGNIFICANT CHANGE UP (ref 3.3–5)
ALP SERPL-CCNC: 116 U/L — SIGNIFICANT CHANGE UP (ref 40–120)
ALT FLD-CCNC: 14 U/L — SIGNIFICANT CHANGE UP (ref 10–45)
ANION GAP SERPL CALC-SCNC: 15 MMOL/L — SIGNIFICANT CHANGE UP (ref 5–17)
APPEARANCE UR: CLEAR — SIGNIFICANT CHANGE UP
AST SERPL-CCNC: 20 U/L — SIGNIFICANT CHANGE UP (ref 10–40)
BACTERIA # UR AUTO: PRESENT /HPF
BASOPHILS NFR BLD AUTO: 0.5 % — SIGNIFICANT CHANGE UP (ref 0–2)
BILIRUB SERPL-MCNC: 0.3 MG/DL — SIGNIFICANT CHANGE UP (ref 0.2–1.2)
BILIRUB UR-MCNC: (no result)
BUN SERPL-MCNC: 10 MG/DL — SIGNIFICANT CHANGE UP (ref 7–23)
CALCIUM SERPL-MCNC: 9.3 MG/DL — SIGNIFICANT CHANGE UP (ref 8.4–10.5)
CHLORIDE SERPL-SCNC: 103 MMOL/L — SIGNIFICANT CHANGE UP (ref 96–108)
CO2 SERPL-SCNC: 21 MMOL/L — LOW (ref 22–31)
COLOR SPEC: YELLOW — SIGNIFICANT CHANGE UP
CREAT SERPL-MCNC: 1.11 MG/DL — SIGNIFICANT CHANGE UP (ref 0.5–1.3)
DIFF PNL FLD: (no result)
EOSINOPHIL NFR BLD AUTO: 2 % — SIGNIFICANT CHANGE UP (ref 0–6)
EPI CELLS # UR: SIGNIFICANT CHANGE UP /HPF
EXTRA BLUE TOP TUBE: SIGNIFICANT CHANGE UP
EXTRA SST TUBE: SIGNIFICANT CHANGE UP
GLUCOSE SERPL-MCNC: 110 MG/DL — HIGH (ref 70–99)
GLUCOSE UR QL: NEGATIVE — SIGNIFICANT CHANGE UP
HCG SERPL-ACNC: <.1 MIU/ML — SIGNIFICANT CHANGE UP
HCT VFR BLD CALC: 32.3 % — LOW (ref 34.5–45)
HGB BLD-MCNC: 10.1 G/DL — LOW (ref 11.5–15.5)
KETONES UR-MCNC: (no result) MG/DL
LEUKOCYTE ESTERASE UR-ACNC: NEGATIVE — SIGNIFICANT CHANGE UP
LYMPHOCYTES # BLD AUTO: 20.5 % — SIGNIFICANT CHANGE UP (ref 13–44)
MCHC RBC-ENTMCNC: 23.6 PG — LOW (ref 27–34)
MCHC RBC-ENTMCNC: 31.3 G/DL — LOW (ref 32–36)
MCV RBC AUTO: 75.5 FL — LOW (ref 80–100)
MONOCYTES NFR BLD AUTO: 7.1 % — SIGNIFICANT CHANGE UP (ref 2–14)
NEUTROPHILS NFR BLD AUTO: 69.9 % — SIGNIFICANT CHANGE UP (ref 43–77)
NITRITE UR-MCNC: NEGATIVE — SIGNIFICANT CHANGE UP
PH UR: 6 — SIGNIFICANT CHANGE UP (ref 5–8)
PLATELET # BLD AUTO: 678 K/UL — HIGH (ref 150–400)
POTASSIUM SERPL-MCNC: 4.4 MMOL/L — SIGNIFICANT CHANGE UP (ref 3.5–5.3)
POTASSIUM SERPL-SCNC: 4.4 MMOL/L — SIGNIFICANT CHANGE UP (ref 3.5–5.3)
PROT SERPL-MCNC: 7.8 G/DL — SIGNIFICANT CHANGE UP (ref 6–8.3)
PROT UR-MCNC: 30 MG/DL
RBC # BLD: 4.28 M/UL — SIGNIFICANT CHANGE UP (ref 3.8–5.2)
RBC # FLD: 23 % — HIGH (ref 10.3–16.9)
RBC CASTS # UR COMP ASSIST: < 5 /HPF — SIGNIFICANT CHANGE UP
SODIUM SERPL-SCNC: 139 MMOL/L — SIGNIFICANT CHANGE UP (ref 135–145)
SP GR SPEC: >=1.03 — SIGNIFICANT CHANGE UP (ref 1–1.03)
UROBILINOGEN FLD QL: 0.2 E.U./DL — SIGNIFICANT CHANGE UP
WBC # BLD: 12 K/UL — HIGH (ref 3.8–10.5)
WBC # FLD AUTO: 12 K/UL — HIGH (ref 3.8–10.5)
WBC UR QL: < 5 /HPF — SIGNIFICANT CHANGE UP

## 2017-09-28 PROCEDURE — 99285 EMERGENCY DEPT VISIT HI MDM: CPT

## 2017-09-28 PROCEDURE — 76830 TRANSVAGINAL US NON-OB: CPT | Mod: 26

## 2017-09-28 PROCEDURE — 76856 US EXAM PELVIC COMPLETE: CPT | Mod: 26

## 2017-09-28 RX ORDER — MORPHINE SULFATE 50 MG/1
4 CAPSULE, EXTENDED RELEASE ORAL
Qty: 0 | Refills: 0 | Status: DISCONTINUED | OUTPATIENT
Start: 2017-09-28 | End: 2017-09-30

## 2017-09-28 RX ORDER — ONDANSETRON 8 MG/1
4 TABLET, FILM COATED ORAL ONCE
Qty: 0 | Refills: 0 | Status: COMPLETED | OUTPATIENT
Start: 2017-09-28 | End: 2017-09-29

## 2017-09-28 RX ORDER — METOCLOPRAMIDE HCL 10 MG
10 TABLET ORAL EVERY 6 HOURS
Qty: 0 | Refills: 0 | Status: DISCONTINUED | OUTPATIENT
Start: 2017-09-28 | End: 2017-10-02

## 2017-09-28 RX ORDER — KETOROLAC TROMETHAMINE 30 MG/ML
30 SYRINGE (ML) INJECTION ONCE
Qty: 0 | Refills: 0 | Status: DISCONTINUED | OUTPATIENT
Start: 2017-09-28 | End: 2017-09-28

## 2017-09-28 RX ORDER — HYDROMORPHONE HYDROCHLORIDE 2 MG/ML
0.5 INJECTION INTRAMUSCULAR; INTRAVENOUS; SUBCUTANEOUS
Qty: 0 | Refills: 0 | Status: DISCONTINUED | OUTPATIENT
Start: 2017-09-28 | End: 2017-09-29

## 2017-09-28 RX ORDER — SODIUM CHLORIDE 9 MG/ML
1000 INJECTION, SOLUTION INTRAVENOUS
Qty: 0 | Refills: 0 | Status: DISCONTINUED | OUTPATIENT
Start: 2017-09-28 | End: 2017-09-28

## 2017-09-28 RX ORDER — OXYCODONE AND ACETAMINOPHEN 5; 325 MG/1; MG/1
1 TABLET ORAL EVERY 4 HOURS
Qty: 0 | Refills: 0 | Status: DISCONTINUED | OUTPATIENT
Start: 2017-09-28 | End: 2017-09-29

## 2017-09-28 RX ORDER — SODIUM CHLORIDE 9 MG/ML
1000 INJECTION, SOLUTION INTRAVENOUS
Qty: 0 | Refills: 0 | Status: DISCONTINUED | OUTPATIENT
Start: 2017-09-28 | End: 2017-09-29

## 2017-09-28 RX ORDER — MORPHINE SULFATE 50 MG/1
2 CAPSULE, EXTENDED RELEASE ORAL EVERY 4 HOURS
Qty: 0 | Refills: 0 | Status: DISCONTINUED | OUTPATIENT
Start: 2017-09-28 | End: 2017-09-28

## 2017-09-28 RX ORDER — MORPHINE SULFATE 50 MG/1
4 CAPSULE, EXTENDED RELEASE ORAL ONCE
Qty: 0 | Refills: 0 | Status: DISCONTINUED | OUTPATIENT
Start: 2017-09-28 | End: 2017-09-28

## 2017-09-28 RX ORDER — DIPHENHYDRAMINE HCL 50 MG
25 CAPSULE ORAL ONCE
Qty: 0 | Refills: 0 | Status: COMPLETED | OUTPATIENT
Start: 2017-09-28 | End: 2017-09-28

## 2017-09-28 RX ORDER — SODIUM CHLORIDE 9 MG/ML
1000 INJECTION INTRAMUSCULAR; INTRAVENOUS; SUBCUTANEOUS ONCE
Qty: 0 | Refills: 0 | Status: COMPLETED | OUTPATIENT
Start: 2017-09-28 | End: 2017-09-28

## 2017-09-28 RX ORDER — OXYCODONE AND ACETAMINOPHEN 5; 325 MG/1; MG/1
2 TABLET ORAL EVERY 6 HOURS
Qty: 0 | Refills: 0 | Status: DISCONTINUED | OUTPATIENT
Start: 2017-09-28 | End: 2017-09-29

## 2017-09-28 RX ADMIN — Medication 30 MILLIGRAM(S): at 14:40

## 2017-09-28 RX ADMIN — MORPHINE SULFATE 4 MILLIGRAM(S): 50 CAPSULE, EXTENDED RELEASE ORAL at 15:11

## 2017-09-28 RX ADMIN — Medication 30 MILLIGRAM(S): at 14:55

## 2017-09-28 RX ADMIN — MORPHINE SULFATE 4 MILLIGRAM(S): 50 CAPSULE, EXTENDED RELEASE ORAL at 22:20

## 2017-09-28 RX ADMIN — HYDROMORPHONE HYDROCHLORIDE 0.5 MILLIGRAM(S): 2 INJECTION INTRAMUSCULAR; INTRAVENOUS; SUBCUTANEOUS at 21:51

## 2017-09-28 RX ADMIN — Medication 25 MILLIGRAM(S): at 15:11

## 2017-09-28 RX ADMIN — HYDROMORPHONE HYDROCHLORIDE 0.5 MILLIGRAM(S): 2 INJECTION INTRAMUSCULAR; INTRAVENOUS; SUBCUTANEOUS at 21:00

## 2017-09-28 RX ADMIN — MORPHINE SULFATE 4 MILLIGRAM(S): 50 CAPSULE, EXTENDED RELEASE ORAL at 22:00

## 2017-09-28 RX ADMIN — SODIUM CHLORIDE 2000 MILLILITER(S): 9 INJECTION INTRAMUSCULAR; INTRAVENOUS; SUBCUTANEOUS at 14:01

## 2017-09-28 NOTE — ED ADULT NURSE NOTE - OBJECTIVE STATEMENT
Pt c/o Lower abdominal pain x 3 days with worsening pain level and vaginal bleeding since 09/01/2017. Pt did not use any pain medication to retrieve the pain. C/O lightheadedness after "I go to the bathroom." Denies n/V, SOB, fever or chills.

## 2017-09-28 NOTE — ED PROVIDER NOTE - MEDICAL DECISION MAKING DETAILS
LLQ pain and vag bleeding. pt well appearing. pain meds given. labs noted. u/s done. pt evaluated in ED and recommend admission to Dr Tineo

## 2017-09-28 NOTE — ED PROVIDER NOTE - OBJECTIVE STATEMENT
48 y/o female with hx of fibroids and s/p diagnostic laparoscopy 8/2017 c/o vag bleeding and LLQ pain. pt states bleeding began again 9/1 and one pad every 1 to3 hrs. pt reports sharp intermittent pain to LLQ began 2 days ago. no fever or chills. no n/v/d. no urinary sx's. no vag d/c. no further complaints. pt states spoke with Dr Tineo today and instructed to come to ED.

## 2017-09-28 NOTE — ED ADULT TRIAGE NOTE - ARRIVAL INFO ADDITIONAL COMMENTS
1 pad every 2 hours, bright red with clots. Denies any fevers, N/V/D, syncopal episodes. No prior tx.

## 2017-09-28 NOTE — BRIEF OPERATIVE NOTE - PROCEDURE
<<-----Click on this checkbox to enter Procedure Endometrial ablation  [facility]28/2017  [facility]  [facility]  [id number]

## 2017-09-28 NOTE — ED ADULT TRIAGE NOTE - CHIEF COMPLAINT QUOTE
"I have been bleeding since the middle of September and 2 days ago I have pain in my lower part of my stomach, more on the left."

## 2017-09-28 NOTE — H&P ADULT - HISTORY OF PRESENT ILLNESS
48 yo  presents for L sided abdominal pain that started yesterday night. Pt states the pain is crampy constant and states she could not go to sleep at night due to the pain. Pt states she has not taken any pain medication at home because she is supposed to be NPO for a scheduled procedure (Hysteroscopy, Endometrial ablation) today. Pt states she was here 17 for heavy vaginal bleeding and had a hysteroscopy, dilation and curettage at that time and was transfused PRBC. Pt states vaginal bleeding decreased after she had the diag. hysteroscopy, D&C. She has saturated through 5 tampons since this morning at 6AM. Pt states associated lightheadedness when standing up.    Pt denies fever, chills, chest pain, SOB, abdominal pain, or dysuria     Obhx: 1 FT , 1 MAB w/ D&C, VTOP D&C  GynHx: regular menses until January, then became irreg, no hx of STDs, fibroids, cysts, polyps  PMH: Asthma  PSH: D&C x3, sinus surgery  Meds: Albuterol PRN  NKDA  Fam Hx: Mother had 53 fibroids    PHYSICAL EXAM:   Vital Signs Last 24 Hrs  T(C): 36.7 (28 Sep 2017 13:14), Max: 36.7 (28 Sep 2017 13:14)  T(F): 98 (28 Sep 2017 13:14), Max: 98 (28 Sep 2017 13:14)  HR: 112 (28 Sep 2017 13:14) (112 - 112)  BP: 148/89 (28 Sep 2017 13:14) (148/89 - 148/89)  BP(mean): --  RR: 18 (28 Sep 2017 13:14) (18 - 18)  SpO2: 100% (28 Sep 2017 13:14) (100% - 100%)    **************************  Gen: NAD   Abdomen: soft, tenderness to palpation LLQ, suprapubic region, guarding, nondistended, +bowel sounds   Pelvic: normal external genitalia, cervix closed, <10 cc dark red blood in vault, no active bleeding    LABS:                        10.1   12.0  )-----------( 678      ( 28 Sep 2017 13:37 )             32.3     09-28    139  |  103  |  10  ----------------------------<  110<H>  4.4   |  21<L>  |  1.11    Ca    9.3      28 Sep 2017 13:37    TPro  7.8  /  Alb  4.1  /  TBili  0.3  /  DBili  x   /  AST  20  /  ALT  14  /  AlkPhos  116      Urinalysis Basic - ( 28 Sep 2017 14:24 )    Color: Yellow / Appearance: Clear / SG: >=1.030 / pH: x  Gluc: x / Ketone: Trace mg/dL  / Bili: Small / Urobili: 0.2 E.U./dL   Blood: x / Protein: 30 mg/dL / Nitrite: NEGATIVE   Leuk Esterase: NEGATIVE / RBC: < 5 /HPF / WBC < 5 /HPF   Sq Epi: x / Non Sq Epi: Few /HPF / Bacteria: Present /HPF    HCG Quantitative, Serum: <.1 mIU/mL ( @ 13:37)    RADIOLOGY & ADDITIONAL STUDIES:    A/P: 48 yo  presents for L sided crampy, constant abdominal pain that started yesterday night. Pt is tachycardic to 110s possibly due to pain now s/p IV Toradol x1, afebrile. hemoglobin stable at 10.1, WBC 12, Pt is not pregnant. NPO, IV hydration, pending TVUS.

## 2017-09-28 NOTE — PRE-OP CHECKLIST - SELECT TESTS ORDERED
Type and Screen/HCG/CXR/Urinalysis/CMP/INR/PT/PTT/CBC HCG/CXR/CMP/PT/PTT/Urinalysis/Type and Screen/INR/CBC/9/28 per ED documentation uhCG negative

## 2017-09-28 NOTE — ED PROVIDER NOTE - ATTENDING CONTRIBUTION TO CARE
48 y/o female with hx of fibroids and s/p diagnostic laparoscopy 8/2017 c/o vag bleeding and LLQ pain. pt states bleeding began again 9/1 and one pad every 1 to3 hrs. pt reports sharp intermittent pain to LLQ began 2 days ago. no fever or chills. no n/v/d. no urinary sx's. no vag d/c. no further complaints. pt states spoke with Dr Tineo today and instructed to come to ED.  Pt with stable hgb but to be admitted for further mgmt.  Pt admitted prior to me seeing.

## 2017-09-29 ENCOUNTER — TRANSCRIPTION ENCOUNTER (OUTPATIENT)
Age: 47
End: 2017-09-29

## 2017-09-29 LAB
CULTURE RESULTS: SIGNIFICANT CHANGE UP
HCG SERPL-ACNC: <.1 MIU/ML — SIGNIFICANT CHANGE UP
HCT VFR BLD CALC: 27 % — LOW (ref 34.5–45)
HCT VFR BLD CALC: 27.7 % — LOW (ref 34.5–45)
HGB BLD-MCNC: 8.6 G/DL — LOW (ref 11.5–15.5)
HGB BLD-MCNC: 8.7 G/DL — LOW (ref 11.5–15.5)
MCHC RBC-ENTMCNC: 24 PG — LOW (ref 27–34)
MCHC RBC-ENTMCNC: 24.4 PG — LOW (ref 27–34)
MCHC RBC-ENTMCNC: 31.4 G/DL — LOW (ref 32–36)
MCHC RBC-ENTMCNC: 31.9 G/DL — LOW (ref 32–36)
MCV RBC AUTO: 76.3 FL — LOW (ref 80–100)
MCV RBC AUTO: 76.5 FL — LOW (ref 80–100)
PLATELET # BLD AUTO: 524 K/UL — HIGH (ref 150–400)
PLATELET # BLD AUTO: 530 K/UL — HIGH (ref 150–400)
RBC # BLD: 3.53 M/UL — LOW (ref 3.8–5.2)
RBC # BLD: 3.63 M/UL — LOW (ref 3.8–5.2)
RBC # FLD: 22.7 % — HIGH (ref 10.3–16.9)
RBC # FLD: 22.9 % — HIGH (ref 10.3–16.9)
SPECIMEN SOURCE: SIGNIFICANT CHANGE UP
WBC # BLD: 18.7 K/UL — HIGH (ref 3.8–10.5)
WBC # BLD: 21.4 K/UL — HIGH (ref 3.8–10.5)
WBC # FLD AUTO: 18.7 K/UL — HIGH (ref 3.8–10.5)
WBC # FLD AUTO: 21.4 K/UL — HIGH (ref 3.8–10.5)

## 2017-09-29 PROCEDURE — 72197 MRI PELVIS W/O & W/DYE: CPT | Mod: 26

## 2017-09-29 RX ORDER — DIPHENHYDRAMINE HCL 50 MG
50 CAPSULE ORAL ONCE
Qty: 0 | Refills: 0 | Status: COMPLETED | OUTPATIENT
Start: 2017-09-29 | End: 2017-09-29

## 2017-09-29 RX ORDER — ACETAMINOPHEN 500 MG
975 TABLET ORAL EVERY 8 HOURS
Qty: 0 | Refills: 0 | Status: DISCONTINUED | OUTPATIENT
Start: 2017-09-29 | End: 2017-10-02

## 2017-09-29 RX ORDER — DIPHENHYDRAMINE HCL 50 MG
25 CAPSULE ORAL EVERY 4 HOURS
Qty: 0 | Refills: 0 | Status: DISCONTINUED | OUTPATIENT
Start: 2017-09-29 | End: 2017-10-02

## 2017-09-29 RX ORDER — SODIUM CHLORIDE 9 MG/ML
1000 INJECTION, SOLUTION INTRAVENOUS
Qty: 0 | Refills: 0 | Status: DISCONTINUED | OUTPATIENT
Start: 2017-09-29 | End: 2017-09-30

## 2017-09-29 RX ORDER — ACETAMINOPHEN WITH CODEINE 300MG-30MG
1 TABLET ORAL EVERY 4 HOURS
Qty: 0 | Refills: 0 | Status: DISCONTINUED | OUTPATIENT
Start: 2017-09-29 | End: 2017-10-02

## 2017-09-29 RX ORDER — ACETAMINOPHEN WITH CODEINE 300MG-30MG
30 TABLET ORAL
Qty: 10 | Refills: 0 | OUTPATIENT
Start: 2017-09-29

## 2017-09-29 RX ORDER — ACETAMINOPHEN WITH CODEINE 300MG-30MG
2 TABLET ORAL EVERY 4 HOURS
Qty: 0 | Refills: 0 | Status: DISCONTINUED | OUTPATIENT
Start: 2017-09-29 | End: 2017-10-02

## 2017-09-29 RX ORDER — IBUPROFEN 200 MG
600 TABLET ORAL EVERY 6 HOURS
Qty: 0 | Refills: 0 | Status: DISCONTINUED | OUTPATIENT
Start: 2017-09-29 | End: 2017-09-29

## 2017-09-29 RX ORDER — MORPHINE SULFATE 50 MG/1
4 CAPSULE, EXTENDED RELEASE ORAL ONCE
Qty: 0 | Refills: 0 | Status: DISCONTINUED | OUTPATIENT
Start: 2017-09-29 | End: 2017-09-29

## 2017-09-29 RX ADMIN — Medication 2 TABLET(S): at 14:30

## 2017-09-29 RX ADMIN — ONDANSETRON 4 MILLIGRAM(S): 8 TABLET, FILM COATED ORAL at 00:08

## 2017-09-29 RX ADMIN — Medication 2 TABLET(S): at 17:40

## 2017-09-29 RX ADMIN — Medication 25 MILLIGRAM(S): at 19:48

## 2017-09-29 RX ADMIN — MORPHINE SULFATE 4 MILLIGRAM(S): 50 CAPSULE, EXTENDED RELEASE ORAL at 19:55

## 2017-09-29 RX ADMIN — Medication 2 TABLET(S): at 13:30

## 2017-09-29 RX ADMIN — Medication 10 MILLIGRAM(S): at 17:45

## 2017-09-29 RX ADMIN — Medication 50 MILLIGRAM(S): at 10:29

## 2017-09-29 RX ADMIN — Medication 2 TABLET(S): at 18:40

## 2017-09-29 RX ADMIN — MORPHINE SULFATE 4 MILLIGRAM(S): 50 CAPSULE, EXTENDED RELEASE ORAL at 19:38

## 2017-09-29 RX ADMIN — Medication 975 MILLIGRAM(S): at 10:31

## 2017-09-29 RX ADMIN — MORPHINE SULFATE 4 MILLIGRAM(S): 50 CAPSULE, EXTENDED RELEASE ORAL at 10:40

## 2017-09-29 RX ADMIN — Medication 975 MILLIGRAM(S): at 09:31

## 2017-09-29 RX ADMIN — MORPHINE SULFATE 4 MILLIGRAM(S): 50 CAPSULE, EXTENDED RELEASE ORAL at 10:21

## 2017-09-29 RX ADMIN — Medication 10 MILLIGRAM(S): at 04:18

## 2017-09-29 RX ADMIN — Medication 10 MILLIGRAM(S): at 10:30

## 2017-09-29 NOTE — DISCHARGE NOTE ADULT - CARE PROVIDER_API CALL
Genevieve Tineo), Obstetrics and Gynecology  [address] [address]  Phone: [phone]  Fax: [phone]

## 2017-09-29 NOTE — PROGRESS NOTE ADULT - SUBJECTIVE AND OBJECTIVE BOX
Patient evaluated at bedside.  Pt states that she has saturated 8 pads since 7 AM.  I asked her to save the pads for my inspection.    Some pads are saturated though it's full thickness  but the blood only occupies ~ 20 % of each pad.  Hgb this AM is 8.7 gm/dl.  She reports pain is well controlled with Tylenol #3.  She denies headache, dizziness, chest pain, palpitations, shortness of breathe, nausea, vomiting or heavy vaginal bleeding.  She has been ambulating without assistance, voiding spontaneously, passing gas, tolerating regular diet and is breastfeeding.    PHYSICAL EXAM:    GA: NAD, A+0 x 3  CV: RRR  Pulm: CTA BL  Breasts: soft, nontender, no palpable masses  Abd: ( + ) BS, soft, nontender, nondistended, no rebound or guarding,   : lochia mild to moderate  Extremities: no swelling or calf tenderness, reflexes +2 bilaterally               8.7    18.7  )-----------( 524      ( 29 Sep 2017 12:21 )             27.7     09-28    139  |  103  |  10  ----------------------------<  110<H>  4.4   |  21<L>  |  1.11    Ca    9.3      28 Sep 2017 13:37    TPro  7.8  /  Alb  4.1  /  TBili  0.3  /  DBili  x   /  AST  20  /  ALT  14  /  AlkPhos  116  09-28    Assessment:    POD # 1 s/p endometriaablation    Plan:    Diet: regular diet   Labs: repeat CBC this   Pain meds: Continue Tylenol #3  Activity: OOB ad eunice  MRI pending  S/P IR consult Patient evaluated at bedside.  Pt states that she has saturated 8 pads since 7 AM.  I asked her to save the pads for my inspection.    Some pads are saturated though it's full thickness  but the blood only occupies ~ 20 % of each pad.  Hgb this AM is 8.7 gm/dl.  She reports pain is well controlled with Tylenol #3.  She denies headache, dizziness, chest pain, palpitations, shortness of breathe, nausea, vomiting or heavy vaginal bleeding.  She has been ambulating without assistance, voiding spontaneously, passing gas, tolerating regular diet and is breastfeeding.    PHYSICAL EXAM:    GA: NAD, A+0 x 3  CV: RRR  Pulm: CTA BL  Breasts: soft, nontender, no palpable masses  Abd: ( + ) BS, soft, nontender, nondistended, no rebound or guarding,   : lochia mild to moderate  Extremities: no swelling or calf tenderness, reflexes +2 bilaterally               8.7    18.7  )-----------( 524      ( 29 Sep 2017 12:21 )             27.7     09-28    139  |  103  |  10  ----------------------------<  110<H>  4.4   |  21<L>  |  1.11    Ca    9.3      28 Sep 2017 13:37    TPro  7.8  /  Alb  4.1  /  TBili  0.3  /  DBili  x   /  AST  20  /  ALT  14  /  AlkPhos  116  09-28    Assessment:    POD # 1 s/p endometriaablation    Plan:    Diet: regular diet   Labs: repeat CBC this   Pain meds: Continue Tylenol #3  Activity: OOB ad eunice  MRI pending  S/P IR consult  case d/w Gyn Onc.  Awaiting consult

## 2017-09-29 NOTE — PROGRESS NOTE ADULT - SUBJECTIVE AND OBJECTIVE BOX
Subjective: Patient is complaining of some abdominal pain. She did not receive any medications overnight and fell behind in pain control, requiring IV morphine this AM. She is currently significantly improved, though still has abdominal pain. Secondary to her kidneys she can not use motrin and does not like narcotics, making pain control difficult. She is passing gas and tolerating a regular diet. She continues to have vaginal bleeding, but is decreased since admission and is not passing clots or soaking through a pad in an hour.     Patient denies fevers, chills, chest pain, shortness of breath, nausea, vomiting, diarrhea or constipation     Vital Signs Last 24 Hrs  T(C): 35.9 (29 Sep 2017 04:16), Max: 37.3 (28 Sep 2017 17:20)  T(F): 96.7 (29 Sep 2017 04:16), Max: 99.1 (28 Sep 2017 17:20)  HR: 78 (29 Sep 2017 08:25) (72 - 100)  BP: 122/78 (29 Sep 2017 08:25) (99/54 - 132/75)  BP(mean): 69 (28 Sep 2017 22:04) (67 - 105)  RR: 17 (29 Sep 2017 08:25) (12 - 23)  SpO2: 99% (29 Sep 2017 08:25) (97% - 100%)  I&O's Summary    28 Sep 2017 07:01  -  29 Sep 2017 07:00  --------------------------------------------------------  IN: 0 mL / OUT: 400 mL / NET: -400 mL      MEDICATIONS  (STANDING):    MEDICATIONS  (PRN):  morphine  - Injectable 4 milliGRAM(s) IV Push every 15 minutes PRN Severe Pain  metoclopramide Injectable 10 milliGRAM(s) IV Push every 6 hours PRN Nausea and/or Vomiting  acetaminophen   Tablet. 975 milliGRAM(s) Oral every 8 hours PRN Moderate Pain (4 - 6)  diphenhydrAMINE   Capsule 25 milliGRAM(s) Oral every 4 hours PRN Rash and/or Itching  acetaminophen 300 mG/codeine 30 mG 2 Tablet(s) Oral every 4 hours PRN Severe Pain (7 - 10)  acetaminophen 300 mG/codeine 30 mG 1 Tablet(s) Oral every 4 hours PRN Moderate Pain (4 - 6)      PHYSICAL EXAM   Constitutional: Alert & Oriented x3, No acute distress, cooperative   Gastrointestinal: soft, mildly tender, positive bowel sounds, no rebound or guarding   Genitourinary: minimal vaginal bleeding   Extremities: no calf tenderness or swelling    LABS:                        8.7    18.7  )-----------( 524      ( 29 Sep 2017 12:21 )             27.7     09-28    139  |  103  |  10  ----------------------------<  110<H>  4.4   |  21<L>  |  1.11    Ca    9.3      28 Sep 2017 13:37    TPro  7.8  /  Alb  4.1  /  TBili  0.3  /  DBili  x   /  AST  20  /  ALT  14  /  AlkPhos  116  09-28      Urinalysis Basic - ( 28 Sep 2017 14:24 )    Color: Yellow / Appearance: Clear / SG: >=1.030 / pH: x  Gluc: x / Ketone: Trace mg/dL  / Bili: Small / Urobili: 0.2 E.U./dL   Blood: x / Protein: 30 mg/dL / Nitrite: NEGATIVE   Leuk Esterase: NEGATIVE / RBC: < 5 /HPF / WBC < 5 /HPF   Sq Epi: x / Non Sq Epi: Few /HPF / Bacteria: Present /HPF

## 2017-09-29 NOTE — DISCHARGE NOTE ADULT - PATIENT PORTAL LINK FT
“You can access the [employer] Patient Portal, offered by [facility], by registering with the following website: [email or web address]”

## 2017-09-29 NOTE — DISCHARGE NOTE ADULT - HOSPITAL COURSE
47 year old patient with history of menorrhagia and fibroids. Patient scheduled for endometrial ablation, admitted preoperatively secondary to abdominal pain and concern for ovarian cyst and torsion. Transvaginal ultrasound negative. Patient underwent diagnostic hysteroscopy, D&C, and endometrial ablation without complication. Uncomplicated postoperative course. Patient discharged in stable condition.

## 2017-09-29 NOTE — DISCHARGE NOTE ADULT - CARE PLAN
Principal Discharge DX:	Endometriosis  Goal:	be happy  Instructions for follow-up, activity and diet:	resume activities of daily living, pelvic rest, no sexual intercourse, f/u with Dr. Tineo in 2 weeks

## 2017-09-29 NOTE — DISCHARGE NOTE ADULT - MEDICATION SUMMARY - MEDICATIONS TO TAKE
I will START or STAY ON the medications listed below when I get home from the hospital:    ibuprofen 600 mg oral tablet  -- 1 tab(s) by mouth every 6 hours  -- Indication: For pain I will START or STAY ON the medications listed below when I get home from the hospital:    ibuprofen 600 mg oral tablet  -- 1 tab(s) by mouth every 6 hours  -- Indication: For pain     acetaminophen-codeine 300 mg-15 mg oral tablet  -- 1 tab(s) by mouth every 4 hours, As Needed -for severe pain MDD:6 tabs  -- Caution federal law prohibits the transfer of this drug to any person other  than the person for whom it was prescribed.  May cause drowsiness.  Alcohol may intensify this effect.  Use care when operating dangerous machinery.  This product contains acetaminophen.  Do not use  with any other product containing acetaminophen to prevent possible liver damage.  Using more of this medication than prescribed may cause serious breathing problems.    -- Indication: For severe pain

## 2017-09-29 NOTE — DISCHARGE NOTE ADULT - PLAN OF CARE
be happy resume activities of daily living, pelvic rest, no sexual intercourse, f/u with Dr. Tineo in 2 weeks

## 2017-09-29 NOTE — PROGRESS NOTE ADULT - SUBJECTIVE AND OBJECTIVE BOX
Patient seen at bedside. She continues to have vaginal bleeding-- pad changed 3x over 8 hours but is not passing clots and reports pain improved from yesterday. Patient is voiding and ambulating. Has passed flatus. Tolerating clears. Denies headache, dizziness, shortness of breath, palpitations. She had nausea earlier in the evening but is not nauseated this morning.     T(C): 35.9 (09-29-17 @ 04:16), Max: 37.3 (09-28-17 @ 17:20)  HR: 72 (09-29-17 @ 04:16) (72 - 112)  BP: 128/67 (09-29-17 @ 04:16) (99/54 - 148/89)  RR: 15 (09-29-17 @ 04:16) (12 - 23)  SpO2: 99% (09-29-17 @ 04:16) (97% - 100%)  Gen: NAD, AO x3  Chest: normal work of breathing  Abdomen: soft, nontender, nondistended, no rebound or guarding, normal bowel sounds  : 15 cc blood on pad, bright red, no clots  Extremities: no calf tenderness or erythema

## 2017-09-30 LAB
ANION GAP SERPL CALC-SCNC: 16 MMOL/L — SIGNIFICANT CHANGE UP (ref 5–17)
BASOPHILS NFR BLD AUTO: 0.2 % — SIGNIFICANT CHANGE UP (ref 0–2)
BUN SERPL-MCNC: 6 MG/DL — LOW (ref 7–23)
CALCIUM SERPL-MCNC: 8.2 MG/DL — LOW (ref 8.4–10.5)
CHLORIDE SERPL-SCNC: 101 MMOL/L — SIGNIFICANT CHANGE UP (ref 96–108)
CO2 SERPL-SCNC: 21 MMOL/L — LOW (ref 22–31)
CREAT SERPL-MCNC: 0.87 MG/DL — SIGNIFICANT CHANGE UP (ref 0.5–1.3)
EOSINOPHIL NFR BLD AUTO: 1.6 % — SIGNIFICANT CHANGE UP (ref 0–6)
GLUCOSE SERPL-MCNC: 96 MG/DL — SIGNIFICANT CHANGE UP (ref 70–99)
HCT VFR BLD CALC: 27.9 % — LOW (ref 34.5–45)
HGB BLD-MCNC: 8.7 G/DL — LOW (ref 11.5–15.5)
LYMPHOCYTES # BLD AUTO: 12.4 % — LOW (ref 13–44)
MCHC RBC-ENTMCNC: 23.9 PG — LOW (ref 27–34)
MCHC RBC-ENTMCNC: 31.2 G/DL — LOW (ref 32–36)
MCV RBC AUTO: 76.6 FL — LOW (ref 80–100)
MONOCYTES NFR BLD AUTO: 8 % — SIGNIFICANT CHANGE UP (ref 2–14)
NEUTROPHILS NFR BLD AUTO: 77.8 % — HIGH (ref 43–77)
PLATELET # BLD AUTO: 534 K/UL — HIGH (ref 150–400)
POTASSIUM SERPL-MCNC: 3.8 MMOL/L — SIGNIFICANT CHANGE UP (ref 3.5–5.3)
POTASSIUM SERPL-SCNC: 3.8 MMOL/L — SIGNIFICANT CHANGE UP (ref 3.5–5.3)
RBC # BLD: 3.64 M/UL — LOW (ref 3.8–5.2)
RBC # FLD: 23.2 % — HIGH (ref 10.3–16.9)
SODIUM SERPL-SCNC: 138 MMOL/L — SIGNIFICANT CHANGE UP (ref 135–145)
WBC # BLD: 17.1 K/UL — HIGH (ref 3.8–10.5)
WBC # FLD AUTO: 17.1 K/UL — HIGH (ref 3.8–10.5)

## 2017-09-30 PROCEDURE — 71260 CT THORAX DX C+: CPT | Mod: 26

## 2017-09-30 PROCEDURE — 74177 CT ABD & PELVIS W/CONTRAST: CPT | Mod: 26

## 2017-09-30 RX ORDER — DIATRIZOATE MEGLUMINE 180 MG/ML
30 INJECTION, SOLUTION INTRAVESICAL ONCE
Qty: 0 | Refills: 0 | Status: COMPLETED | OUTPATIENT
Start: 2017-09-30 | End: 2017-09-30

## 2017-09-30 RX ORDER — SODIUM CHLORIDE 9 MG/ML
1000 INJECTION, SOLUTION INTRAVENOUS
Qty: 0 | Refills: 0 | Status: DISCONTINUED | OUTPATIENT
Start: 2017-09-30 | End: 2017-10-02

## 2017-09-30 RX ORDER — MORPHINE SULFATE 50 MG/1
2 CAPSULE, EXTENDED RELEASE ORAL EVERY 4 HOURS
Qty: 0 | Refills: 0 | Status: DISCONTINUED | OUTPATIENT
Start: 2017-09-30 | End: 2017-10-02

## 2017-09-30 RX ADMIN — Medication 25 MILLIGRAM(S): at 07:14

## 2017-09-30 RX ADMIN — Medication 2 TABLET(S): at 00:30

## 2017-09-30 RX ADMIN — MORPHINE SULFATE 4 MILLIGRAM(S): 50 CAPSULE, EXTENDED RELEASE ORAL at 07:10

## 2017-09-30 RX ADMIN — Medication 2 TABLET(S): at 23:31

## 2017-09-30 RX ADMIN — Medication 2 TABLET(S): at 16:45

## 2017-09-30 RX ADMIN — MORPHINE SULFATE 2 MILLIGRAM(S): 50 CAPSULE, EXTENDED RELEASE ORAL at 11:55

## 2017-09-30 RX ADMIN — DIATRIZOATE MEGLUMINE 30 MILLILITER(S): 180 INJECTION, SOLUTION INTRAVESICAL at 12:37

## 2017-09-30 RX ADMIN — Medication 2 TABLET(S): at 17:45

## 2017-09-30 RX ADMIN — Medication 2 TABLET(S): at 00:15

## 2017-09-30 RX ADMIN — MORPHINE SULFATE 4 MILLIGRAM(S): 50 CAPSULE, EXTENDED RELEASE ORAL at 07:30

## 2017-09-30 RX ADMIN — MORPHINE SULFATE 2 MILLIGRAM(S): 50 CAPSULE, EXTENDED RELEASE ORAL at 11:39

## 2017-09-30 NOTE — CONSULT NOTE ADULT - SUBJECTIVE AND OBJECTIVE BOX
46 yo  who presented this hospital stay c/o new onset L sided abdominal pain associated with abnormal, heavy uterine bleeding. She is now s/p D&C hysteroscopy with endometrial ablation. She reports a long standing history of DUB for which she has been treated with previous D&C as well as OCP tapers. Pathology from prior D&C reported findings of chorionic villi, despite no recent pregnancy. Given this unexpected finding, pathology was reviewed at [facility], and Dr Marlow felt findings were more consistent with proliferative endometrium. Patient continues to experience heavy vaginal bleeding following endometrial ablation.       Obhx: 1 FT , 1 MAB w/ D&C, VTOP D&C  GynHx: regular menses until January, then became irreg, no hx of STDs, fibroids, cysts, polyps  PMH: Asthma  PSH: D&C x3, sinus surgery  Meds: Albuterol PRN  NKDA  Fam Hx: Mother had 53 fibroids. Grandmother and great-grandmother on maternal side with breast cancer      Vitals: /58, HR: 84, RR: 14 T: 36.9  Gen: NAD  Chest: CTA BL  Ab: soft, ND, tender to deep palpation suprapubically 46 yo  who presented this hospital stay c/o new onset L sided abdominal pain associated with abnormal, heavy uterine bleeding. She is now s/p D&C hysteroscopy with endometrial ablation. She reports a long standing history of DUB for which she has been treated with previous D&C as well as OCP tapers. Pathology from prior D&C reported findings of chorionic villi, despite no recent pregnancy. Given this unexpected finding, pathology was reviewed at [facility], and Dr Marlow felt findings were more consistent with proliferative endometrium. Patient continues to experience heavy vaginal bleeding following endometrial ablation.       Obhx: 1 FT , 1 MAB w/ D&C, VTOP D&C  GynHx: regular menses until January, then became irreg, no hx of STDs, fibroids, cysts, polyps  PMH: Asthma  PSH: D&C x3, sinus surgery  Meds: Albuterol PRN  NKDA  Fam Hx: Mother had 53 fibroids. Grandmother and great-grandmother on maternal side with breast cancer    ROS otherwise negative    Vitals: /58, HR: 84, RR: 14 T: 36.9  Gen: NAD  Chest: CTA BL  Ab: soft, ND, tender to deep palpation suprapubically

## 2017-09-30 NOTE — PROGRESS NOTE ADULT - SUBJECTIVE AND OBJECTIVE BOX
Pt evaluated at bedside.   She reports pain is well controlled. She is tolerating regular diet after CT scan. She is passing gas and had a BM tonight. she is walking and voiding spontaneously. She denies heavy vaginal bleeding.  She denies HA, dizzines, SOB, CP, palpitations, n/v.    Vital Signs Last 24 Hrs  T(C): 36.9 (30 Sep 2017 15:26), Max: 36.9 (30 Sep 2017 08:30)  T(F): 98.5 (30 Sep 2017 15:26), Max: 98.5 (30 Sep 2017 15:26)  HR: 92 (30 Sep 2017 15:26) (77 - 92)  BP: 132/82 (30 Sep 2017 15:26) (108/69 - 132/82)  BP(mean): --  RR: 16 (30 Sep 2017 15:26) (15 - 16)  SpO2: 96% (30 Sep 2017 15:26) (96% - 100%)    GA: NAD   Abd: +BS, soft, NTND, no rebound or guarding  Extrem: no calf tenderness bilaterally                            8.7    17.1  )-----------( 534      ( 30 Sep 2017 11:30 )             27.9     30 Sep 2017 11:30    138    |  101    |  6      ----------------------------<  96     3.8     |  21     |  0.87     Ca    8.2        30 Sep 2017 11:30          CAPILLARY BLOOD GLUCOSE

## 2017-09-30 NOTE — CONSULT NOTE ADULT - ASSESSMENT
48yo with DUB. Gyn oncology consulted secondary to worrisome finding of persistent placental products many years after antecedent pregnancy (her youngest child is ~20). Although there is the possibility that the patient experienced an unknown pregnancy more recently, she reports no sexual activity in approximately 2 years. In the setting of a negative pregnancy test, gestational trophoblastic neoplasm is unlikely. Although placental site trophoblastic tumors can present with normal bHCG, these tend to be aggressive tumors and the finding of localized tumor several years later would be extremely unlikely. Dr. Marlow's evaluation of the pathology specimen is also reassuring. However, we are awaiting the pathology report from the most recent D&C that was performed during this hospital stay.    Management options for AUB were discussed with the patient in detail. Dr. Garcia was present for the discussion. Uterine artery embolization vs. hysterectomy was discussed. Given that the patient is done with childbearing, history of blood transfusions and the ultimate goal would be to get a definitive diagnosis from the uterus, we recommend hysterectomy at this time. Patient is amenable to this plan. We discussed removal of ovaries as well. Patient reports that she had genetic testing and was found to have a BRCA mutation of unknown significance. I explained that in the setting of VUS mutations the consensus is to make decisions based on family history. Given her family history of BRCA associated cancers, I would recommend removal of the ovaries at time of hysterectomy.     Gyn oncology will continue to follow this patient and I will be available for further discussion as pending imaging studies and pathology become available. 46yo with DUB. Gyn oncology consulted secondary to worrisome finding of persistent placental products many years after antecedent pregnancy (her youngest child is ~20). Although there is the possibility that the patient experienced an unknown pregnancy more recently, she reports no sexual activity in approximately 2 years. In the setting of a negative pregnancy test, gestational trophoblastic neoplasm is unlikely. Although placental site trophoblastic tumors can present with normal bHCG, these tend to be aggressive tumors and the finding of localized tumor several years later would be extremely unlikely. Dr. Marlow's evaluation of the pathology specimen is also reassuring. However, we are awaiting the pathology report from the most recent D&C that was performed during this hospital stay.    Management options for AUB were discussed with the patient in detail. Dr. Garcia was present for the discussion. Uterine artery embolization vs. hysterectomy was discussed. Given that the patient is done with childbearing, history of blood transfusions and the ultimate goal would be to get a definitive diagnosis from the uterus, we recommend hysterectomy at this time. Patient is amenable to this plan. We discussed removal of ovaries as well. Patient reports that she had genetic testing and was found to have a BRCA mutation of unknown significance. I explained that in the setting of VUS mutations the consensus is to make decisions based on family history. Given her family history of BRCA associated cancers, I would recommend removal of the ovaries at time of hysterectomy.     Gyn oncology will continue to follow this patient and I will be available for further discussion as pending imaging studies and pathology become available.         Addendum:  CT abdomen pelvis results discussed with patient. Finding of left external iliac lymphadenopathy discussed. Plan for ALEXANDER/BSO tomorrow with Dr. Tineo. Will send frozen section and I will be available to evaluate lymph nodes as indicated. Risks of surgery discussed in detail. Questions answered.

## 2017-09-30 NOTE — PROGRESS NOTE ADULT - SUBJECTIVE AND OBJECTIVE BOX
Pt evaluated at bedside.   She reports pain is well controlled. She is currenlty NPO in preparation for CT. She is not yet passing gas. she is walking and voiding spontaneously. She denies heavy vaginal bleeding.  She denies HA, dizzines, SOB, CP, palpitations, n/v.    T(C): 36.9 (09-30-17 @ 08:30), Max: 36.9 (09-30-17 @ 08:30)  HR: 84 (09-30-17 @ 08:30) (81 - 84)  BP: 121/58 (09-30-17 @ 08:30) (108/69 - 121/58)  RR: 16 (09-30-17 @ 08:30) (15 - 16)  SpO2: 96% (09-30-17 @ 08:30) (96% - 100%)  GA: NAD   Abd: +BS, soft, NTND, no rebound or guarding  Extrem: no calf tenderness bilaterally                          8.6    21.4  )-----------( 530      ( 29 Sep 2017 18:47 )             27.0     09-28    139  |  103  |  10  ----------------------------<  110<H>  4.4   |  21<L>  |  1.11    Ca    9.3      28 Sep 2017 13:37    TPro  7.8  /  Alb  4.1  /  TBili  0.3  /  DBili  x   /  AST  20  /  ALT  14  /  AlkPhos  116  09-28

## 2017-09-30 NOTE — PROGRESS NOTE ADULT - SUBJECTIVE AND OBJECTIVE BOX
Patient evaluated at bedside.   She reports pain is well controlled with Morphine  She denies headache, dizziness, chest pain, palpitations, shortness of breathe, nausea, vomiting or heavy vaginal bleeding.  She has been ambulating without assistance, voiding spontaneously, passing gas, tolerating regular diet and is breastfeeding.    PHYSICAL EXAM:    GA: NAD, A+0 x 3  CV: RRR  Pulm: CTA BL  Breasts: soft, nontender, no palpable masses  Abd: ( + ) BS, soft, nontender, nondistended, no rebound or guarding,   Incision: clean, dry and intact; steri-strips in place  Uterus: Fundus midline; firm at 2 fb above the pubic symphysis  Pads from overnight examined: lochia now scant.  Pads are stained ~ 10 % and not soaked through to the underside.  Extremities: no swelling or calf tenderness, reflexes +2 bilaterally                       8.6    21.4  )-----------( 530      ( 29 Sep 2017 18:47 )             27.0     09-28    139  |  103  |  10  ----------------------------<  110<H>  4.4   |  21<L>  |  1.11    Ca    9.3      28 Sep 2017 13:37    TPro  7.8  /  Alb  4.1  /  TBili  0.3  /  DBili  x   /  AST  20  /  ALT  14  /  AlkPhos  116  09-28          Assessment:    POD # 2 s/p endometrial ablation with continued bleeding    Plan:    Diet: NPO this AM for CT of chest, abdomen abd pelvis.  May eat afterwards  Labs: CBC, BMP this AM  Pain meds: Will continue Tylenol # 3 and Morphine for breakthrough pain  Activity: OOB to halls ad eunice  Gyn Onc consult today

## 2017-10-01 LAB
BLD GP AB SCN SERPL QL: NEGATIVE — SIGNIFICANT CHANGE UP
HCT VFR BLD CALC: 26.7 % — LOW (ref 34.5–45)
HCT VFR BLD CALC: 28.2 % — LOW (ref 34.5–45)
HGB BLD-MCNC: 8.3 G/DL — LOW (ref 11.5–15.5)
HGB BLD-MCNC: 8.7 G/DL — LOW (ref 11.5–15.5)
MCHC RBC-ENTMCNC: 23.4 PG — LOW (ref 27–34)
MCHC RBC-ENTMCNC: 24 PG — LOW (ref 27–34)
MCHC RBC-ENTMCNC: 30.9 G/DL — LOW (ref 32–36)
MCHC RBC-ENTMCNC: 31.1 G/DL — LOW (ref 32–36)
MCV RBC AUTO: 75.8 FL — LOW (ref 80–100)
MCV RBC AUTO: 77.2 FL — LOW (ref 80–100)
PLATELET # BLD AUTO: 493 K/UL — HIGH (ref 150–400)
PLATELET # BLD AUTO: 544 K/UL — HIGH (ref 150–400)
RBC # BLD: 3.46 M/UL — LOW (ref 3.8–5.2)
RBC # BLD: 3.72 M/UL — LOW (ref 3.8–5.2)
RBC # FLD: 22.7 % — HIGH (ref 10.3–16.9)
RBC # FLD: 22.9 % — HIGH (ref 10.3–16.9)
RH IG SCN BLD-IMP: POSITIVE — SIGNIFICANT CHANGE UP
WBC # BLD: 14.1 K/UL — HIGH (ref 3.8–10.5)
WBC # BLD: 15.5 K/UL — HIGH (ref 3.8–10.5)
WBC # FLD AUTO: 14.1 K/UL — HIGH (ref 3.8–10.5)
WBC # FLD AUTO: 15.5 K/UL — HIGH (ref 3.8–10.5)

## 2017-10-01 PROCEDURE — 99254 IP/OBS CNSLTJ NEW/EST MOD 60: CPT

## 2017-10-01 PROCEDURE — 93010 ELECTROCARDIOGRAM REPORT: CPT

## 2017-10-01 RX ADMIN — Medication 2 TABLET(S): at 23:28

## 2017-10-01 RX ADMIN — Medication 2 TABLET(S): at 13:38

## 2017-10-01 RX ADMIN — Medication 2 TABLET(S): at 18:58

## 2017-10-01 RX ADMIN — Medication 2 TABLET(S): at 17:58

## 2017-10-01 RX ADMIN — Medication 2 TABLET(S): at 12:38

## 2017-10-01 RX ADMIN — Medication 2 TABLET(S): at 00:00

## 2017-10-01 RX ADMIN — MORPHINE SULFATE 2 MILLIGRAM(S): 50 CAPSULE, EXTENDED RELEASE ORAL at 14:00

## 2017-10-01 RX ADMIN — Medication 10 MILLIGRAM(S): at 06:02

## 2017-10-01 RX ADMIN — MORPHINE SULFATE 2 MILLIGRAM(S): 50 CAPSULE, EXTENDED RELEASE ORAL at 09:59

## 2017-10-01 RX ADMIN — MORPHINE SULFATE 2 MILLIGRAM(S): 50 CAPSULE, EXTENDED RELEASE ORAL at 13:44

## 2017-10-01 RX ADMIN — MORPHINE SULFATE 2 MILLIGRAM(S): 50 CAPSULE, EXTENDED RELEASE ORAL at 09:44

## 2017-10-01 NOTE — PROGRESS NOTE ADULT - ATTENDING COMMENTS
Pt seen and examined.  I agree with above assessment.  Pt reports she is still bleeding- much less compared to Friday.   Pt reports she discussed the recommendation of hysterectomy with her friends and family.  Also, since she is BRCA inconclusive BSO was also recommended.  This morning, she agrees to BSO as well.  Removal of the cervix as well as laparotomy vs laparoscopy discussed.  Pt called her mother and questions were answered to their verbalized  satisfaction.  S/P CT of chest, abdomen and pelvis.  Awaiting final read but resident prelim read noted.  Results d/w Dr. Bedoya, Gyn Onc.  Pt agrees to surgery now. ALEXANDER/BSO, possible staging recommended.  Pt agrees.  Surgery placed on the schedule for tomorrow.

## 2017-10-01 NOTE — PROGRESS NOTE ADULT - SUBJECTIVE AND OBJECTIVE BOX
Pt evaluated at bedside.  She reports pain is well controlled. She is ambulating. She is tolerating regular diet. She is voiding and passing gas. She had a BM yesterday. She denies heavy vaginal bleeding but does need to use a pad every 4 hours. Pt is scheduled for ALEXANDER tomorrow with Dr. Tineo. She is receiving her pRBCs. Pt indicated some SOB with the blood and therefore transfusion rate was decreased. The SOB has now resolved and she is doing well on RA.   She denies HA, dizzines, CP, palpitations, n/v.    Vital Signs Last 24 Hrs  T(C): 37 (01 Oct 2017 15:41), Max: 37.2 (01 Oct 2017 05:38)  T(F): 98.6 (01 Oct 2017 15:41), Max: 99 (01 Oct 2017 05:38)  HR: 92 (01 Oct 2017 15:41) (92 - 100)  BP: 135/70 (01 Oct 2017 15:41) (102/65 - 135/70)  BP(mean): --  RR: 16 (01 Oct 2017 15:41) (16 - 16)  SpO2: 100% (01 Oct 2017 15:41) (98% - 100%)    GA: NAD                          8.7    14.1  )-----------( 544      ( 01 Oct 2017 19:30 )             28.2

## 2017-10-01 NOTE — PROGRESS NOTE ADULT - SUBJECTIVE AND OBJECTIVE BOX
Pt evaluated at bedside.   She reports pain is well controlled. She is tolerating regular diet after CT scan. She is passing gas still and amulating well. Continues to be voiding spontaneously. She denies heavy vaginal bleeding but is having bleeding still after ablation. Pt states she desires to do everything to control her abnormal bleeding and would like to discuss the option of hysterectomy again.  She denies HA, dizzines, SOB, CP, palpitations, n/v.    Vital Signs Last 24 Hrs  Vital Signs Last 24 Hrs  T(C): 36.7 (30 Sep 2017 20:51), Max: 36.9 (30 Sep 2017 08:30)  T(F): 98 (30 Sep 2017 20:51), Max: 98.5 (30 Sep 2017 15:26)  HR: 98 (30 Sep 2017 20:51) (81 - 98)  BP: 97/59 (30 Sep 2017 20:51) (97/59 - 132/82)  BP(mean): --  RR: 17 (30 Sep 2017 20:51) (15 - 17)  SpO2: 97% (30 Sep 2017 20:51) (96% - 100%)    GA: NAD   Abd: +BS, soft, NTND, no rebound or guarding  Extrem: no calf tenderness bilaterally                            8.7    17.1  )-----------( 534      ( 30 Sep 2017 11:30 )             27.9     30 Sep 2017 11:30    138    |  101    |  6      ----------------------------<  96     3.8     |  21     |  0.87     Ca    8.2        30 Sep 2017 11:30          CAPILLARY BLOOD GLUCOSE

## 2017-10-01 NOTE — PROGRESS NOTE ADULT - SUBJECTIVE AND OBJECTIVE BOX
Interval events: patient seen by GynOnc today and Gyn attending. Patient has decided to proceed with total hysterectomy BSO.    Pt evaluated at bedside.  She reports pain is well controlled. She is ambulating. She is tolerating regular diet. She is voiding and passing gas. She had a BM yesterday. She denies heavy vaginal bleeding.   She denies HA, dizzines, SOB, CP, palpitations, n/v.    T(C): 37 (10-01-17 @ 15:41), Max: 37.2 (10-01-17 @ 09:31)  HR: 92 (10-01-17 @ 15:41) (92 - 100)  BP: 135/70 (10-01-17 @ 15:41) (120/60 - 135/70)  RR: 16 (10-01-17 @ 15:41) (16 - 16)  SpO2: 100% (10-01-17 @ 15:41) (99% - 100%)  GA: NAD  CV: RRR, no MRG  Pulm: CTAB  Abd: +BS, soft, NTND, no rebound or guarding  Extrem: no calf tenderness bilaterally                            8.3    15.5  )-----------( 493      ( 01 Oct 2017 07:17 )             26.7     09-30    138  |  101  |  6<L>  ----------------------------<  96  3.8   |  21<L>  |  0.87    Ca    8.2<L>      30 Sep 2017 11:30

## 2017-10-02 ENCOUNTER — RESULT REVIEW (OUTPATIENT)
Age: 47
End: 2017-10-02

## 2017-10-02 LAB
HCG SERPL-ACNC: <.1 MIU/ML — SIGNIFICANT CHANGE UP
HCT VFR BLD CALC: 31.3 % — LOW (ref 34.5–45)
HGB BLD-MCNC: 10.2 G/DL — LOW (ref 11.5–15.5)
MCHC RBC-ENTMCNC: 24.6 PG — LOW (ref 27–34)
MCHC RBC-ENTMCNC: 32.6 G/DL — SIGNIFICANT CHANGE UP (ref 32–36)
MCV RBC AUTO: 75.6 FL — LOW (ref 80–100)
PLATELET # BLD AUTO: 488 K/UL — HIGH (ref 150–400)
RBC # BLD: 4.14 M/UL — SIGNIFICANT CHANGE UP (ref 3.8–5.2)
RBC # FLD: 20.5 % — HIGH (ref 10.3–16.9)
SURGICAL PATHOLOGY STUDY: SIGNIFICANT CHANGE UP
WBC # BLD: 10.1 K/UL — SIGNIFICANT CHANGE UP (ref 3.8–10.5)
WBC # FLD AUTO: 10.1 K/UL — SIGNIFICANT CHANGE UP (ref 3.8–10.5)

## 2017-10-02 RX ORDER — NALOXONE HYDROCHLORIDE 4 MG/.1ML
0.1 SPRAY NASAL
Qty: 0 | Refills: 0 | Status: DISCONTINUED | OUTPATIENT
Start: 2017-10-02 | End: 2017-10-07

## 2017-10-02 RX ORDER — INFLUENZA VIRUS VACCINE 15; 15; 15; 15 UG/.5ML; UG/.5ML; UG/.5ML; UG/.5ML
0.5 SUSPENSION INTRAMUSCULAR ONCE
Qty: 0 | Refills: 0 | Status: COMPLETED | OUTPATIENT
Start: 2017-10-02 | End: 2017-10-02

## 2017-10-02 RX ORDER — METOCLOPRAMIDE HCL 10 MG
10 TABLET ORAL ONCE
Qty: 0 | Refills: 0 | Status: COMPLETED | OUTPATIENT
Start: 2017-10-02 | End: 2017-10-02

## 2017-10-02 RX ORDER — HYDROMORPHONE HYDROCHLORIDE 2 MG/ML
30 INJECTION INTRAMUSCULAR; INTRAVENOUS; SUBCUTANEOUS
Qty: 0 | Refills: 0 | Status: DISCONTINUED | OUTPATIENT
Start: 2017-10-02 | End: 2017-10-04

## 2017-10-02 RX ORDER — DIPHENHYDRAMINE HCL 50 MG
50 CAPSULE ORAL ONCE
Qty: 0 | Refills: 0 | Status: COMPLETED | OUTPATIENT
Start: 2017-10-02 | End: 2017-10-02

## 2017-10-02 RX ORDER — ONDANSETRON 8 MG/1
4 TABLET, FILM COATED ORAL EVERY 6 HOURS
Qty: 0 | Refills: 0 | Status: DISCONTINUED | OUTPATIENT
Start: 2017-10-02 | End: 2017-10-07

## 2017-10-02 RX ORDER — DIPHENHYDRAMINE HCL 50 MG
25 CAPSULE ORAL EVERY 6 HOURS
Qty: 0 | Refills: 0 | Status: DISCONTINUED | OUTPATIENT
Start: 2017-10-02 | End: 2017-10-07

## 2017-10-02 RX ORDER — SODIUM CHLORIDE 9 MG/ML
1000 INJECTION, SOLUTION INTRAVENOUS
Qty: 0 | Refills: 0 | Status: DISCONTINUED | OUTPATIENT
Start: 2017-10-02 | End: 2017-10-04

## 2017-10-02 RX ORDER — MORPHINE SULFATE 50 MG/1
4 CAPSULE, EXTENDED RELEASE ORAL
Qty: 0 | Refills: 0 | Status: DISCONTINUED | OUTPATIENT
Start: 2017-10-02 | End: 2017-10-02

## 2017-10-02 RX ORDER — BUPIVACAINE 13.3 MG/ML
20 INJECTION, SUSPENSION, LIPOSOMAL INFILTRATION ONCE
Qty: 0 | Refills: 0 | Status: DISCONTINUED | OUTPATIENT
Start: 2017-10-02 | End: 2017-10-07

## 2017-10-02 RX ADMIN — Medication 10 MILLIGRAM(S): at 19:20

## 2017-10-02 RX ADMIN — SODIUM CHLORIDE 125 MILLILITER(S): 9 INJECTION, SOLUTION INTRAVENOUS at 17:50

## 2017-10-02 RX ADMIN — Medication 102 MILLIGRAM(S): at 19:48

## 2017-10-02 RX ADMIN — Medication 25 MILLIGRAM(S): at 22:35

## 2017-10-02 RX ADMIN — MORPHINE SULFATE 4 MILLIGRAM(S): 50 CAPSULE, EXTENDED RELEASE ORAL at 18:28

## 2017-10-02 RX ADMIN — MORPHINE SULFATE 4 MILLIGRAM(S): 50 CAPSULE, EXTENDED RELEASE ORAL at 18:33

## 2017-10-02 RX ADMIN — Medication 2 TABLET(S): at 00:00

## 2017-10-02 RX ADMIN — SODIUM CHLORIDE 125 MILLILITER(S): 9 INJECTION, SOLUTION INTRAVENOUS at 18:00

## 2017-10-02 RX ADMIN — SODIUM CHLORIDE 125 MILLILITER(S): 9 INJECTION, SOLUTION INTRAVENOUS at 17:48

## 2017-10-02 RX ADMIN — MORPHINE SULFATE 4 MILLIGRAM(S): 50 CAPSULE, EXTENDED RELEASE ORAL at 22:45

## 2017-10-02 RX ADMIN — MORPHINE SULFATE 4 MILLIGRAM(S): 50 CAPSULE, EXTENDED RELEASE ORAL at 22:28

## 2017-10-02 RX ADMIN — MORPHINE SULFATE 4 MILLIGRAM(S): 50 CAPSULE, EXTENDED RELEASE ORAL at 18:01

## 2017-10-02 RX ADMIN — HYDROMORPHONE HYDROCHLORIDE 30 MILLILITER(S): 2 INJECTION INTRAMUSCULAR; INTRAVENOUS; SUBCUTANEOUS at 18:35

## 2017-10-02 NOTE — BRIEF OPERATIVE NOTE - PROCEDURE
<<-----Click on this checkbox to enter Procedure Lymph node dissection  10/02/2017    Active  RICK  Abdominal hysterectomy with bilateral salpingo-oophorectomy (BSO)  10/02/2017    Active  RICK

## 2017-10-02 NOTE — BRIEF OPERATIVE NOTE - PRE-OP DX
Menometrorrhagia  09/28/2017    Active  Roma Mariscal
Menometrorrhagia  09/28/2017    Active  Roma Mariscal

## 2017-10-02 NOTE — BRIEF OPERATIVE NOTE - OPERATION/FINDINGS
10-12 cm fibroid uterus, anteverted and mobile
Approx 7cm uterus, bilateral fallopian tubes and ovaries wnl

## 2017-10-02 NOTE — PROGRESS NOTE ADULT - SUBJECTIVE AND OBJECTIVE BOX
Patient seen at bedside. Pain is well controlled. Patient has been NPO since midnight for add on ALEXANDER today. She is voiding, passing flatus, and ambulating. Denies headache, dizziness, nausea/vomiting, shortness of breath, palpitations. She last changed her pad at 4-5 am, <10% saturated now.     T(C): 36.6 (10-02-17 @ 05:42), Max: 37.2 (10-01-17 @ 09:31)  HR: 77 (10-02-17 @ 05:42) (77 - 100)  BP: 100/63 (10-02-17 @ 05:42) (100/63 - 135/70)  RR: 15 (10-02-17 @ 05:42) (15 - 16)  SpO2: 99% (10-02-17 @ 05:42) (99% - 100%)    Gen: NAD, AO x3  Chest: normal work of breathing  Abdomen: soft, nontender, nondistended, no rebound or guarding, normal bowel sounds  : <10% pad saturated bright red blood, no clots  Extremities: no calf tenderness or erythema

## 2017-10-02 NOTE — BRIEF OPERATIVE NOTE - POST-OP DX
Menometrorrhagia  09/28/2017    Active  Roma Mariscal
Adenomyosis  10/02/2017    Active  Kenny Acosta  Menometrorrhagia  09/28/2017    Active  Roma Mariscal

## 2017-10-02 NOTE — PROGRESS NOTE ADULT - SUBJECTIVE AND OBJECTIVE BOX
Patient seen at bedside. Pain is well controlled with PCA.  She is has le ion situ and has not passed flatus or ambulated since surgery. Denies headache, dizziness, nausea/vomiting, shortness of breath, palpitations.     Vital Signs Last 24 Hrs  T(C): 37 (02 Oct 2017 19:00), Max: 37.5 (02 Oct 2017 17:45)  T(F): 98.6 (02 Oct 2017 19:00), Max: 99.5 (02 Oct 2017 17:45)  HR: 78 (02 Oct 2017 21:30) (58 - 80)  BP: 123/57 (02 Oct 2017 21:30) (100/63 - 144/62)  BP(mean): 83 (02 Oct 2017 21:30) (66 - 91)  RR: 16 (02 Oct 2017 21:30) (9 - 23)  SpO2: 99% (02 Oct 2017 21:30) (99% - 100%)    Gen: NAD, AO x3  Chest: normal work of breathing  Abdomen: soft, nontender, nondistended, no rebound or guarding, normal bowel sounds. Provera dressing intact and on suction  Extremities: no calf tenderness or erythema

## 2017-10-02 NOTE — PROVIDER CONTACT NOTE (OTHER) - ACTION/TREATMENT ORDERED:
Continue to monitor and contact physician if patient symptoms persist or VS are outside normal range.

## 2017-10-02 NOTE — PROVIDER CONTACT NOTE (OTHER) - BACKGROUND
Pt has been hospitalized due to abdominal pain and vaginal bleeding. She is receiving 2 units of blood prior to surgery in the morning.

## 2017-10-03 LAB
HCT VFR BLD CALC: 30.3 % — LOW (ref 34.5–45)
HGB BLD-MCNC: 10.1 G/DL — LOW (ref 11.5–15.5)
MCHC RBC-ENTMCNC: 25.1 PG — LOW (ref 27–34)
MCHC RBC-ENTMCNC: 33.3 G/DL — SIGNIFICANT CHANGE UP (ref 32–36)
MCV RBC AUTO: 75.4 FL — LOW (ref 80–100)
PLATELET # BLD AUTO: 514 K/UL — HIGH (ref 150–400)
RBC # BLD: 4.02 M/UL — SIGNIFICANT CHANGE UP (ref 3.8–5.2)
RBC # FLD: 20.9 % — HIGH (ref 10.3–16.9)
WBC # BLD: 13.2 K/UL — HIGH (ref 3.8–10.5)
WBC # FLD AUTO: 13.2 K/UL — HIGH (ref 3.8–10.5)

## 2017-10-03 RX ORDER — HEPARIN SODIUM 5000 [USP'U]/ML
5000 INJECTION INTRAVENOUS; SUBCUTANEOUS EVERY 12 HOURS
Qty: 0 | Refills: 0 | Status: DISCONTINUED | OUTPATIENT
Start: 2017-10-03 | End: 2017-10-03

## 2017-10-03 RX ORDER — DOCUSATE SODIUM 100 MG
100 CAPSULE ORAL THREE TIMES A DAY
Qty: 0 | Refills: 0 | Status: DISCONTINUED | OUTPATIENT
Start: 2017-10-03 | End: 2017-10-07

## 2017-10-03 RX ORDER — SIMETHICONE 80 MG/1
80 TABLET, CHEWABLE ORAL EVERY 8 HOURS
Qty: 0 | Refills: 0 | Status: DISCONTINUED | OUTPATIENT
Start: 2017-10-03 | End: 2017-10-07

## 2017-10-03 RX ORDER — ACETAMINOPHEN 500 MG
975 TABLET ORAL EVERY 8 HOURS
Qty: 0 | Refills: 0 | Status: COMPLETED | OUTPATIENT
Start: 2017-10-03 | End: 2017-10-04

## 2017-10-03 RX ADMIN — SODIUM CHLORIDE 125 MILLILITER(S): 9 INJECTION, SOLUTION INTRAVENOUS at 18:19

## 2017-10-03 RX ADMIN — ONDANSETRON 4 MILLIGRAM(S): 8 TABLET, FILM COATED ORAL at 16:04

## 2017-10-03 RX ADMIN — SIMETHICONE 80 MILLIGRAM(S): 80 TABLET, CHEWABLE ORAL at 19:25

## 2017-10-03 RX ADMIN — Medication 975 MILLIGRAM(S): at 16:05

## 2017-10-03 RX ADMIN — HYDROMORPHONE HYDROCHLORIDE 30 MILLILITER(S): 2 INJECTION INTRAMUSCULAR; INTRAVENOUS; SUBCUTANEOUS at 18:19

## 2017-10-03 RX ADMIN — Medication 975 MILLIGRAM(S): at 09:26

## 2017-10-03 NOTE — PROGRESS NOTE ADULT - SUBJECTIVE AND OBJECTIVE BOX
At bedside POD1 s/p ALEXANDER BSO, LND.  Pt doing very well postoperatively.  Pain well controlled when pca remembered.  Pt denies n/v/d, fever, chills, chest pain, palpitations, sob.    VSS  LABS: pending for AM    ROS: As per interval HPI    PE:   A&Ox3, NAD  CTAB  RRR  Abd obese, s/moderately tender/mildly distended, incision under provena vac that is intact and suctioned, +BS  Ext no calf swelling or tenderness, SCDs on    A/P: POD1 s/p ALEXANDER BSO, LND with finding of adenomyosis on frozen section.  Pt doing very well subjectively and clinically.  - Clear diet  - Discuss conversion to PO pain from PCA with Dr. Tineo as well as Michelle seymour  - OOB with assistance  - IS encouraged  - f/u AM labs    Welebir PGY4

## 2017-10-03 NOTE — PROGRESS NOTE ADULT - SUBJECTIVE AND OBJECTIVE BOX
Pt seen at bedside due to complaints of LLE pain in the calf. This started this AM although she has been ambulating all day. Her left leg is tender at one spot half way up the calf. Small increase in size of left leg but no erythremia or heat. Discussed with Dr. Tineo. PT to be started on heparin and get Dopplers ASAP

## 2017-10-03 NOTE — DIETITIAN INITIAL EVALUATION ADULT. - ENERGY NEEDS
Height: 5'5" Weight: 195lbs, IBW 125lbs+/-10%, %%, BMI 32  IBW used for calculations as pt >120% of IBW

## 2017-10-03 NOTE — PROGRESS NOTE ADULT - SUBJECTIVE AND OBJECTIVE BOX
Patient seen at bedside. Pain is well controlled. Patient has a le and SCDs. Has not yet passed flatus but has ambulated. Complained of "vaginal spasms" after ambulating from bathroom to bed. Tolerating clears. Denies headache, dizziness, nausea/vomiting, shortness of breath, palpitations, heavy bleeding.     T(C): 37.2 (10-03-17 @ 17:52), Max: 37.2 (10-03-17 @ 17:52)  HR: 78 (10-03-17 @ 17:52) (58 - 829)  BP: 127/75 (10-03-17 @ 17:52) (91/62 - 127/75)  RR: 17 (10-03-17 @ 17:52) (9 - 17)  SpO2: 95% (10-03-17 @ 17:52) (95% - 100%)  Gen: NAD, AO x3  Chest: normal work of breathing  Abdomen: soft, appropriately tender, appropriately distended, no rebound or guarding, normal bowel sounds  Incision: prevena  Extremities: no calf tenderness or erythema                          10.1   13.2  )-----------( 514      ( 03 Oct 2017 07:55 )             30.3

## 2017-10-03 NOTE — DIETITIAN INITIAL EVALUATION ADULT. - OTHER INFO
48yo F POD1 s/p ALEXANDER BSO, LND with finding of adenomyosis on frozen section. Currently on Clear liquid diet and tolerating well. Discussed diet advancement process. Menu was provided. Denies GI distress at the present. C/o pain. NKFA or dietary restrictions. UBW is 170-180lbs, pt states once she "got sick" her weight went up to 210lbs. Documented wt is 195lbs. Skin: surgical incision, GI WDL per flowsheet.

## 2017-10-04 DIAGNOSIS — D50.0 IRON DEFICIENCY ANEMIA SECONDARY TO BLOOD LOSS (CHRONIC): ICD-10-CM

## 2017-10-04 DIAGNOSIS — I82.890 ACUTE EMBOLISM AND THROMBOSIS OF OTHER SPECIFIED VEINS: ICD-10-CM

## 2017-10-04 DIAGNOSIS — I82.432 ACUTE EMBOLISM AND THROMBOSIS OF LEFT POPLITEAL VEIN: ICD-10-CM

## 2017-10-04 LAB
FERRITIN SERPL-MCNC: 34.8 NG/ML — SIGNIFICANT CHANGE UP (ref 15–150)
IRON SATN MFR SERPL: 22 UG/DL — LOW (ref 30–160)
IRON SATN MFR SERPL: 7 % — LOW (ref 14–50)
TIBC SERPL-MCNC: 323 UG/DL — SIGNIFICANT CHANGE UP (ref 220–430)
UIBC SERPL-MCNC: 301 UG/DL — SIGNIFICANT CHANGE UP (ref 110–370)

## 2017-10-04 PROCEDURE — 93970 EXTREMITY STUDY: CPT | Mod: 26

## 2017-10-04 RX ORDER — ACETAMINOPHEN WITH CODEINE 300MG-30MG
2 TABLET ORAL EVERY 4 HOURS
Qty: 0 | Refills: 0 | Status: DISCONTINUED | OUTPATIENT
Start: 2017-10-04 | End: 2017-10-07

## 2017-10-04 RX ORDER — ACETAMINOPHEN WITH CODEINE 300MG-30MG
1 TABLET ORAL EVERY 4 HOURS
Qty: 0 | Refills: 0 | Status: DISCONTINUED | OUTPATIENT
Start: 2017-10-04 | End: 2017-10-07

## 2017-10-04 RX ORDER — ENOXAPARIN SODIUM 100 MG/ML
100 INJECTION SUBCUTANEOUS EVERY 12 HOURS
Qty: 0 | Refills: 0 | Status: DISCONTINUED | OUTPATIENT
Start: 2017-10-04 | End: 2017-10-04

## 2017-10-04 RX ORDER — ENOXAPARIN SODIUM 100 MG/ML
100 INJECTION SUBCUTANEOUS EVERY 12 HOURS
Qty: 0 | Refills: 0 | Status: DISCONTINUED | OUTPATIENT
Start: 2017-10-05 | End: 2017-10-07

## 2017-10-04 RX ORDER — HEPARIN SODIUM 5000 [USP'U]/ML
5000 INJECTION INTRAVENOUS; SUBCUTANEOUS EVERY 8 HOURS
Qty: 0 | Refills: 0 | Status: DISCONTINUED | OUTPATIENT
Start: 2017-10-04 | End: 2017-10-04

## 2017-10-04 RX ORDER — ENOXAPARIN SODIUM 100 MG/ML
90 INJECTION SUBCUTANEOUS EVERY 12 HOURS
Qty: 0 | Refills: 0 | Status: DISCONTINUED | OUTPATIENT
Start: 2017-10-04 | End: 2017-10-04

## 2017-10-04 RX ADMIN — Medication 2 TABLET(S): at 11:13

## 2017-10-04 RX ADMIN — HEPARIN SODIUM 5000 UNIT(S): 5000 INJECTION INTRAVENOUS; SUBCUTANEOUS at 00:33

## 2017-10-04 RX ADMIN — ENOXAPARIN SODIUM 90 MILLIGRAM(S): 100 INJECTION SUBCUTANEOUS at 16:55

## 2017-10-04 RX ADMIN — HEPARIN SODIUM 5000 UNIT(S): 5000 INJECTION INTRAVENOUS; SUBCUTANEOUS at 07:40

## 2017-10-04 RX ADMIN — SIMETHICONE 80 MILLIGRAM(S): 80 TABLET, CHEWABLE ORAL at 05:41

## 2017-10-04 RX ADMIN — Medication 2 TABLET(S): at 15:42

## 2017-10-04 RX ADMIN — SIMETHICONE 80 MILLIGRAM(S): 80 TABLET, CHEWABLE ORAL at 22:21

## 2017-10-04 RX ADMIN — Medication 100 MILLIGRAM(S): at 14:42

## 2017-10-04 RX ADMIN — Medication 100 MILLIGRAM(S): at 22:21

## 2017-10-04 RX ADMIN — Medication 100 MILLIGRAM(S): at 00:33

## 2017-10-04 RX ADMIN — Medication 2 TABLET(S): at 14:42

## 2017-10-04 RX ADMIN — Medication 975 MILLIGRAM(S): at 00:25

## 2017-10-04 RX ADMIN — Medication 2 TABLET(S): at 10:13

## 2017-10-04 RX ADMIN — SIMETHICONE 80 MILLIGRAM(S): 80 TABLET, CHEWABLE ORAL at 14:42

## 2017-10-04 RX ADMIN — Medication 5 MILLIGRAM(S): at 18:43

## 2017-10-04 NOTE — CONSULT NOTE ADULT - ASSESSMENT
48 yo AA/[place] female with hx of menstrual bleeding with TRACEY previously treated with estrogen/progesterone therapy who is found to have a splenic v thrombus and LLE popliteal DVT on POD#2.  It is probable that the splenic v thrombus is provoked by underlying obese body habitus and recent estrogen therapy plus flight (immobilization) and the LLE DVT is provoked by the above plus immobilization from recent surgery, but given the unusual location of the abdominal DVT and family hx of multiple VTE events, it is prudent to evaluate the patient for an underlying hypercoagulable disease.

## 2017-10-04 NOTE — PROGRESS NOTE ADULT - SUBJECTIVE AND OBJECTIVE BOX
INTERVAL HPI/OVERNIGHT EVENTS:      Vital Signs Last 24 Hrs  T(C): 36.9 (04 Oct 2017 14:51), Max: 37.2 (03 Oct 2017 17:52)  T(F): 98.5 (04 Oct 2017 14:51), Max: 99 (03 Oct 2017 17:52)  HR: 88 (04 Oct 2017 14:51) (78 - 90)  BP: 110/70 (04 Oct 2017 14:51) (110/70 - 136/73)  BP(mean): --  RR: 16 (04 Oct 2017 14:51) (16 - 18)  SpO2: 97% (04 Oct 2017 14:51) (95% - 99%)  I&O's Summary    03 Oct 2017 07:01  -  04 Oct 2017 07:00  --------------------------------------------------------  IN: 3075 mL / OUT: 2600 mL / NET: 475 mL    04 Oct 2017 07:01  -  04 Oct 2017 17:08  --------------------------------------------------------  IN: 400 mL / OUT: 800 mL / NET: -400 mL      MEDICATIONS  (STANDING):  BUpivacaine liposome 1.3% Injectable (no eMAR) 20 milliLiter(s) Local Injection once  docusate sodium 100 milliGRAM(s) Oral three times a day  enoxaparin Injectable 90 milliGRAM(s) SubCutaneous every 12 hours  influenza   Vaccine 0.5 milliLiter(s) IntraMuscular once  simethicone 80 milliGRAM(s) Chew every 8 hours    MEDICATIONS  (PRN):  acetaminophen 300 mG/codeine 30 mG 1 Tablet(s) Oral every 4 hours PRN Moderate Pain (4 - 6)  acetaminophen 300 mG/codeine 30 mG 2 Tablet(s) Oral every 4 hours PRN Severe Pain (7 - 10)  diphenhydrAMINE   Capsule 25 milliGRAM(s) Oral every 6 hours PRN Rash and/or Itching  naloxone Injectable 0.1 milliGRAM(s) IV Push every 3 minutes PRN For ANY of the following changes in patient status:  A. RR LESS THAN 10 breaths per minute, B. Oxygen saturation LESS THAN 90%, C. Sedation score of 6  ondansetron Injectable 4 milliGRAM(s) IV Push every 6 hours PRN Nausea    ROS: as per interval HPI    PE:  A&Ox3, NAD  CTAB  RRR  Abd s/appropriately tender/nd, incision under c/d incisional vac    LABS:                        10.1   13.2  )-----------( 514      ( 03 Oct 2017 07:55 )             30.3     Rad: LLE doppler - L Posterior Tibial DVT    A/P: POD2 s/p ALEXANDER BSO, LND.  Pt continues to have pain in the left calf, otherwise no toxic complaints and is meeting all milestones for postoperative recovery.    P:  - Lovenox 90mg BID therapeutic for DVT  - Reg diet  - OPM  - s/p Heme/Onc recs: f/u thrombophilia w/u and Fe studies  - IS encouraged    Welebir PGY4

## 2017-10-04 NOTE — CONSULT NOTE ADULT - SUBJECTIVE AND OBJECTIVE BOX
Hematology/Oncology Consult (Dr. Mckay)  Discussed with housestaff; will review with Dr. Mckay    46 yo female with no significant medical hx apart from heavy menses for last year or so who was treated with estrogen/progesterone therapy per pt is being seen today for new dx of RLE popliteal DVT on POD#2 (s/p hysterectomy).  Pt was in [facility] until about a year ago when she developed heavy menses and sought attn by her gynecologist.  She was treated with hormone therapy with little improvement and is now s/p unsuccessful endometrial ablation approx 6 days earlier.  Pt had rapid decline in Hb with persistence of heavy vaginal bleeding and was taken for definitive ALEXANDER/BSO now POD#2. In the interim, pt had CT abd which did reveal a splenic vein thrombos.  Pt notes that she has had increased abdominal bloating during the last few mos, but otherwise no fevers/chills/night sweats.  No change in appetite or wt loss.  She noticed left calf swelling and pain yesterday and had U/S of the LE today revealing the LE DVT.  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 [employer]  Fam Hx: + breast ca in MGM, + colon ca in PGM, DVT paternal aunt; DVT/PE in multiple 2nd degree/distant relatives    Screening Hx: she is up to date with mammogram and pap smear both nl; she has actually had 2 screening colonoscopies (both nl)    MEDICATIONS  (STANDING):  BUpivacaine liposome 1.3% Injectable (no eMAR) 20 milliLiter(s) Local Injection once  docusate sodium 100 milliGRAM(s) Oral three times a day  enoxaparin Injectable 90 milliGRAM(s) SubCutaneous every 12 hours  influenza   Vaccine 0.5 milliLiter(s) IntraMuscular once  simethicone 80 milliGRAM(s) Chew every 8 hours    MEDICATIONS  (PRN):  acetaminophen 300 mG/codeine 30 mG 1 Tablet(s) Oral every 4 hours PRN Moderate Pain (4 - 6)  acetaminophen 300 mG/codeine 30 mG 2 Tablet(s) Oral every 4 hours PRN Severe Pain (7 - 10)  diphenhydrAMINE   Capsule 25 milliGRAM(s) Oral every 6 hours PRN Rash and/or Itching  naloxone Injectable 0.1 milliGRAM(s) IV Push every 3 minutes PRN For ANY of the following changes in patient status:  A. RR LESS THAN 10 breaths per minute, B. Oxygen saturation LESS THAN 90%, C. Sedation score of 6  ondansetron Injectable 4 milliGRAM(s) IV Push every 6 hours PRN Nausea    PHYSICAL EXAM:    T(F): 98.5 (10-04-17 @ 14:51), Max: 99 (10-03-17 @ 17:52)  HR: 88 (10-04-17 @ 14:51) (78 - 90)  BP: 110/70 (10-04-17 @ 14:51) (110/70 - 136/73)  RR: 16 (10-04-17 @ 14:51) (16 - 18)  SpO2: 97% (10-04-17 @ 14:51) (95% - 99%)  Wt(kg): --    Daily Height in cm: 165.1 (04 Oct 2017 17:22)    Daily Weight in k.3 (04 Oct 2017 17:22)    Gen: well developed, well nourished, obese, comfortable  HEENT: normocephalic/atraumatic, no conjunctival pallor, no scleral icterus, no oral thrush/mucosal bleeding/mucositis  Neck: supple, no masses, no JVD  Cardiovascular: RR, nl S1S2, no murmurs/rubs/gallops  Respiratory: clear air entry b/l  Gastrointestinal: BS+, soft, mildly tender at incision site; per pt request unable to assess for hepatosplenomegaly  Extremities: no clubbing/cyanosis, no edema, no calf tenderness  Vascular:  DP/PT 2+ b/l  Neurological:  no focal deficits  Skin: no rash on visible skin  Lymph Nodes:  no cervical/supraclavicular LAD  Musculoskeletal:  full ROM  Psychiatric:  mood stable        Labs:                          10.1   13.2  )-----------( 514      ( 03 Oct 2017 07:55 )             30.3     CBC Full  -  ( 03 Oct 2017 07:55 )  WBC Count : 13.2 K/uL  Hemoglobin : 10.1 g/dL  Hematocrit : 30.3 %  Platelet Count - Automated : 514 K/uL  Mean Cell Volume : 75.4 fL  Mean Cell Hemoglobin : 25.1 pg  Mean Cell Hemoglobin Concentration : 33.3 g/dL  Auto Neutrophil # : x  Auto Lymphocyte # : x  Auto Monocyte # : x  Auto Eosinophil # : x  Auto Basophil # : x  Auto Neutrophil % : x  Auto Lymphocyte % : x  Auto Monocyte % : x  Auto Eosinophil % : x  Auto Basophil % : x    Other Labs:    Pathology:  Surgical Pathology Report (17 @ 10:24)    Surgical Pathology Report:   ACCESSION No:  [id number]    LAZARUS, RAINA TARA                   1          Final Diagnosis    1.  Endometrial curettage:  - Predominantly blood with polypoid fragments of secretory  type endometrium with prominent pseudo-decidualized stroma.    Note: This case was discussed with Dr. Nilam Pereira at 11:45am  on 10/2/17.    Logan Pa MD  (Electronic Signature)  Reported on: 10/02/17    Clinical History  48-year-old  with approximately 2 months history of  menorrhagia status post dilation and curettage ([id number])  on 8/10/17 with continued bleeding    Specimen(s) Submitted  1     [facility]    Gross Description  The specimen is received in formalin, labeled with the patient's  identification and "[facility]C."  It consists of multiple irregular  fragments of pink-red soft tissue admixed with red-brown blood  clot measuring 4.5 x 3.5 x 2.5 cm in aggregate.  The specimen is  entirely submitted in eight cassettes, 1A-1H.  PH 17 11:01      Imaging Studies:    < from: CT Abdomen and Pelvis w/ Oral Cont and w/ IV Cont (17 @ 16:19) >  EXAM:  CT CHEST OC IC                          EXAM:  CT ABDOMEN AND PELVIS OC IC                          PROCEDURE DATE:  2017        < from: CT Abdomen and Pelvis w/ Oral Cont and w/ IV Cont (17 @ 16:19) >  IMPRESSION:  1.  Enlarged and heterogeneous uterus with abnormal and markedly   thickened endometrium. These findings are suspicious for gestational   trophoblastic disease, given provided history.   2.  Prominent hypervascular left external iliac lymph node, as well as   increased prominent sized left periaortic hypervascular lymph nodes.   Metastatic disease cannot be excluded.   3.  Mural prominence of the rectosigmoid colon and a colitis cannot be   excluded. Focal areas of wall thickening involving the hepatic and   splenic flexure regions thought to represent areas of peristalsis.   Endoscopic correlation to exclude primary colonic lesions may be in order.  4.  Filling defect within the proximal and distal splenic vein consistent   with thrombus.  5.   Multiple solid subcentimeter micronodules largest measuring 5 mm. In   view of the patient's history short interval surveillance is suggested to   exclude malignancy.    < from: US Duplex Venous Lower Ext Complete, Bilateral (10.04.17 @ 13:34) >  EXAM:  US DPLX LWR EXT VEINS COMPL BI                          PROCEDURE DATE:  10/04/2017      < from: US Duplex Venous Lower Ext Complete, Bilateral (10.04.17 @ 13:34) >  IMPRESSION:  Deep venous thrombosis within the left posterior tibial vein.

## 2017-10-04 NOTE — CONSULT NOTE ADULT - PROBLEM SELECTOR RECOMMENDATION 9
as above, is possibly provoked by recent surgery but will evaluate for underlying hypercoagulable state: pls check Factor V Leiden, PT gene mutation, ATIII, Protein C and S, Lupus anticoagulant, o1xhldluasmubl, anticardiolipin Abs  -discussed with pt at length and her preferred treatment modalities are lovenox or warfarin: pls start pt on lovenox 1mg/kg BID --will discuss with Dr. Mckay and decide if ok to dose lovenox as 1.5mg/kg daily moving forward  -she will likely require at minimum 3 mos of treatment with definitive anticoagulation depending on above w/u

## 2017-10-04 NOTE — PROGRESS NOTE ADULT - SUBJECTIVE AND OBJECTIVE BOX
POD2 s/p ALEXANDER.  Pt doing very well.  Pain well controlled. Reports deep calf pain intermittently when walking LLE.  Denies n/v/d, fever, chills, chest pain, sob, palpitations.     Vital Signs Last 24 Hrs  T(C): 36.4 (04 Oct 2017 05:16), Max: 37.2 (03 Oct 2017 17:52)  T(F): 97.6 (04 Oct 2017 05:16), Max: 99 (03 Oct 2017 17:52)  HR: 81 (04 Oct 2017 05:16) (72 - 90)  BP: 111/70 (04 Oct 2017 05:16) (111/70 - 136/73)  BP(mean): --  RR: 18 (04 Oct 2017 05:16) (16 - 18)  SpO2: 98% (04 Oct 2017 05:16) (95% - 100%)  I&O's Summary    02 Oct 2017 07:01  -  03 Oct 2017 07:00  --------------------------------------------------------  IN: 1750 mL / OUT: 1305 mL / NET: 445 mL    03 Oct 2017 07:01  -  04 Oct 2017 06:04  --------------------------------------------------------  IN: 2825 mL / OUT: 2150 mL / NET: 675 mL      MEDICATIONS  (STANDING):  BUpivacaine liposome 1.3% Injectable (no eMAR) 20 milliLiter(s) Local Injection once  docusate sodium 100 milliGRAM(s) Oral three times a day  heparin  Injectable 5000 Unit(s) SubCutaneous every 8 hours  HYDROmorphone PCA (1 mG/mL) 30 milliLiter(s) PCA Continuous PCA Continuous  influenza   Vaccine 0.5 milliLiter(s) IntraMuscular once  lactated ringers. 1000 milliLiter(s) (125 mL/Hr) IV Continuous <Continuous>  simethicone 80 milliGRAM(s) Chew every 8 hours    MEDICATIONS  (PRN):  diphenhydrAMINE   Capsule 25 milliGRAM(s) Oral every 6 hours PRN Rash and/or Itching  naloxone Injectable 0.1 milliGRAM(s) IV Push every 3 minutes PRN For ANY of the following changes in patient status:  A. RR LESS THAN 10 breaths per minute, B. Oxygen saturation LESS THAN 90%, C. Sedation score of 6  ondansetron Injectable 4 milliGRAM(s) IV Push every 6 hours PRN Nausea    ROS: as per interval hpi    PE:  A&Ox3, NAD  CTAB  RRR  Abd s/mildly tender/nd, incision under provena incision vac c/d  Ext: SCDs in place, no tenderness/swelling of BLE on exam though pt complains of deep pain in LLE.  No appreciable swelling of LLE, neg homans    LABS:                        10.1   13.2  )-----------( 514      ( 03 Oct 2017 07:55 )             30.3       A/P: POD2 s/p ALEXANDER, BSO, LND.  Pt doing very well.  Will be r/o for DVT of LLE due to complaint.  Continues to meet milestones of recovery    Plan:  - Reg diet  - dc PCA to OPM  - OOB  - f/u LLE doppler  - IS spirometer    Welebir PGY4

## 2017-10-04 NOTE — CONSULT NOTE ADULT - PROBLEM SELECTOR RECOMMENDATION 3
-microcytic anemia in setting of vaginal blood loss likely an Fe deficiency anemia  -would be appropriate to assess Fe studies now if not done as an outpt to address any need for Fe supplementation which would likely be oral supplementation given Hb>10  -transfuse for Hb<7  *mild thrombocytosis likely reactive to recent surgery and Fe deficiency anemia

## 2017-10-04 NOTE — CONSULT NOTE ADULT - PROBLEM SELECTOR RECOMMENDATION 2
as above, it is possibly related to underlying estrogen therapy combined with flight related immobility in an obese pt, but this is an unusual location  -will treat with anticoagulant as above and review hypercoagulable w/u as above (if any abnormalities found these tests will need to be repeated in approx 12 weeks)

## 2017-10-05 LAB — SURGICAL PATHOLOGY STUDY: SIGNIFICANT CHANGE UP

## 2017-10-05 RX ADMIN — Medication 100 MILLIGRAM(S): at 14:11

## 2017-10-05 RX ADMIN — Medication 2 TABLET(S): at 00:21

## 2017-10-05 RX ADMIN — Medication 2 TABLET(S): at 01:21

## 2017-10-05 RX ADMIN — Medication 2 TABLET(S): at 04:35

## 2017-10-05 RX ADMIN — Medication 2 TABLET(S): at 22:43

## 2017-10-05 RX ADMIN — SIMETHICONE 80 MILLIGRAM(S): 80 TABLET, CHEWABLE ORAL at 05:22

## 2017-10-05 RX ADMIN — Medication 2 TABLET(S): at 23:13

## 2017-10-05 RX ADMIN — Medication 2 TABLET(S): at 13:59

## 2017-10-05 RX ADMIN — Medication 100 MILLIGRAM(S): at 22:44

## 2017-10-05 RX ADMIN — ENOXAPARIN SODIUM 100 MILLIGRAM(S): 100 INJECTION SUBCUTANEOUS at 05:22

## 2017-10-05 RX ADMIN — Medication 2 TABLET(S): at 19:00

## 2017-10-05 RX ADMIN — SIMETHICONE 80 MILLIGRAM(S): 80 TABLET, CHEWABLE ORAL at 22:45

## 2017-10-05 RX ADMIN — Medication 2 TABLET(S): at 17:30

## 2017-10-05 RX ADMIN — Medication 2 TABLET(S): at 11:37

## 2017-10-05 RX ADMIN — ENOXAPARIN SODIUM 100 MILLIGRAM(S): 100 INJECTION SUBCUTANEOUS at 17:33

## 2017-10-05 RX ADMIN — SIMETHICONE 80 MILLIGRAM(S): 80 TABLET, CHEWABLE ORAL at 14:11

## 2017-10-05 RX ADMIN — Medication 2 TABLET(S): at 08:59

## 2017-10-05 RX ADMIN — Medication 2 TABLET(S): at 05:35

## 2017-10-05 RX ADMIN — Medication 2 TABLET(S): at 13:01

## 2017-10-05 NOTE — PROGRESS NOTE ADULT - SUBJECTIVE AND OBJECTIVE BOX
POD3 am.  Pt doing very well in context of new onset DVT.  Reports pain in left calf, otherwise no toxic complaints.  Denies n/v/d, fever, chills, chest pain, sob, palpitations.    Vital Signs Last 24 Hrs  T(C): 36.8 (05 Oct 2017 05:29), Max: 36.9 (04 Oct 2017 14:51)  T(F): 98.3 (05 Oct 2017 05:29), Max: 98.5 (04 Oct 2017 14:51)  HR: 78 (05 Oct 2017 05:29) (78 - 88)  BP: 111/66 (05 Oct 2017 05:29) (103/62 - 125/78)  BP(mean): --  RR: 16 (05 Oct 2017 05:29) (16 - 16)  SpO2: 100% (05 Oct 2017 05:29) (97% - 100%)  I&O's Summary    04 Oct 2017 07:01  -  05 Oct 2017 07:00  --------------------------------------------------------  IN: 880 mL / OUT: 1800 mL / NET: -920 mL      MEDICATIONS  (STANDING):  BUpivacaine liposome 1.3% Injectable (no eMAR) 20 milliLiter(s) Local Injection once  docusate sodium 100 milliGRAM(s) Oral three times a day  enoxaparin Injectable 100 milliGRAM(s) SubCutaneous every 12 hours  influenza   Vaccine 0.5 milliLiter(s) IntraMuscular once  simethicone 80 milliGRAM(s) Chew every 8 hours    MEDICATIONS  (PRN):  acetaminophen 300 mG/codeine 30 mG 1 Tablet(s) Oral every 4 hours PRN Moderate Pain (4 - 6)  acetaminophen 300 mG/codeine 30 mG 2 Tablet(s) Oral every 4 hours PRN Severe Pain (7 - 10)  bisacodyl 5 milliGRAM(s) Oral every 12 hours PRN Constipation  diphenhydrAMINE   Capsule 25 milliGRAM(s) Oral every 6 hours PRN Rash and/or Itching  naloxone Injectable 0.1 milliGRAM(s) IV Push every 3 minutes PRN For ANY of the following changes in patient status:  A. RR LESS THAN 10 breaths per minute, B. Oxygen saturation LESS THAN 90%, C. Sedation score of 6  ondansetron Injectable 4 milliGRAM(s) IV Push every 6 hours PRN Nausea    ROS: as per interval hpi    PE:  A&ox3, NAD  CTAB  RRR  Abd soft, appropriately tender, nd, incision clean under provena vac  Ext: Left calf tender to palpation, RLE no calf pain or swelling        LABS:                        10.1   13.2  )-----------( 514      ( 03 Oct 2017 07:55 )             30.3       A/P: POD3 s/p ALEXANDER BSO for AUB.  Pt is meeting milestones of recovery, but has confirmed L posterior tibial dvt being therapeutically anticoagulated and w/u per [facility] for possible thrombophilia given multiple relatives with history of DVT.    Plan:  - REg diet  - OOB, NO SCDs  - Lovenox 100 BID  - f/u Iron studies and thrombophilia labs  - OPM  - Monitor for signs of DVT progress, including PE  - Follow with Dr. Ruano as far as dispo planning and medication    Welebir PGY4

## 2017-10-05 NOTE — PROGRESS NOTE ADULT - SUBJECTIVE AND OBJECTIVE BOX
Subjective: No acute events this morning. Patient continues to complain of left calf pain that is improved with ice. She is passing gas, tolerating a regular diet. She is ambulating to the restroom without an issue.     Patient denies fevers, chills, chest pain, shortness of breath, nausea, vomiting, diarrhea or constipation     Vital Signs Last 24 Hrs  T(C): 36.9 (05 Oct 2017 14:06), Max: 36.9 (04 Oct 2017 14:51)  T(F): 98.5 (05 Oct 2017 14:06), Max: 98.5 (04 Oct 2017 14:51)  HR: 86 (05 Oct 2017 14:06) (75 - 88)  BP: 115/74 (05 Oct 2017 14:06) (103/62 - 120/76)  BP(mean): --  RR: 16 (05 Oct 2017 14:06) (16 - 16)  SpO2: 98% (05 Oct 2017 14:06) (97% - 100%)  I&O's Summary    04 Oct 2017 07:01  -  05 Oct 2017 07:00  --------------------------------------------------------  IN: 880 mL / OUT: 1800 mL / NET: -920 mL    05 Oct 2017 07:01  -  05 Oct 2017 14:44  --------------------------------------------------------  IN: 580 mL / OUT: 550 mL / NET: 30 mL      MEDICATIONS  (STANDING):  BUpivacaine liposome 1.3% Injectable (no eMAR) 20 milliLiter(s) Local Injection once  docusate sodium 100 milliGRAM(s) Oral three times a day  enoxaparin Injectable 100 milliGRAM(s) SubCutaneous every 12 hours  influenza   Vaccine 0.5 milliLiter(s) IntraMuscular once  simethicone 80 milliGRAM(s) Chew every 8 hours    MEDICATIONS  (PRN):  acetaminophen 300 mG/codeine 30 mG 1 Tablet(s) Oral every 4 hours PRN Moderate Pain (4 - 6)  acetaminophen 300 mG/codeine 30 mG 2 Tablet(s) Oral every 4 hours PRN Severe Pain (7 - 10)  bisacodyl 5 milliGRAM(s) Oral every 12 hours PRN Constipation  diphenhydrAMINE   Capsule 25 milliGRAM(s) Oral every 6 hours PRN Rash and/or Itching  naloxone Injectable 0.1 milliGRAM(s) IV Push every 3 minutes PRN For ANY of the following changes in patient status:  A. RR LESS THAN 10 breaths per minute, B. Oxygen saturation LESS THAN 90%, C. Sedation score of 6  ondansetron Injectable 4 milliGRAM(s) IV Push every 6 hours PRN Nausea      PHYSICAL EXAM   Constitutional: Alert & Oriented x3, No acute distress, cooperative   Gastrointestinal: obese, soft, mildly tender, positive bowel sounds, no rebound or guarding   Incision: dry and intact, no erythema or induration.   Extremities: left calf swelling, mild erythema. Normal right calf and thigh

## 2017-10-06 LAB
AT III ACT/NOR PPP CHRO: 73 % — LOW (ref 85–135)
CARDIOLIPIN AB SER-ACNC: NEGATIVE — SIGNIFICANT CHANGE UP
CONFIRM APTT STACLOT: NEGATIVE — SIGNIFICANT CHANGE UP
DRVVT SCREEN TO CONFIRM RATIO: SIGNIFICANT CHANGE UP
LA NT DPL PPP QL: 29.5 SEC — SIGNIFICANT CHANGE UP
PROT C ACT/NOR PPP: 97 % — SIGNIFICANT CHANGE UP (ref 74–150)
PROT S FREE AG PPP IA-ACNC: 105 % — SIGNIFICANT CHANGE UP (ref 61–131)

## 2017-10-06 RX ORDER — ENOXAPARIN SODIUM 100 MG/ML
1 INJECTION SUBCUTANEOUS
Qty: 30 | Refills: 0 | OUTPATIENT
Start: 2017-10-06 | End: 2017-11-05

## 2017-10-06 RX ADMIN — ONDANSETRON 4 MILLIGRAM(S): 8 TABLET, FILM COATED ORAL at 08:21

## 2017-10-06 RX ADMIN — Medication 2 TABLET(S): at 03:02

## 2017-10-06 RX ADMIN — ENOXAPARIN SODIUM 100 MILLIGRAM(S): 100 INJECTION SUBCUTANEOUS at 17:27

## 2017-10-06 RX ADMIN — Medication 2 TABLET(S): at 03:32

## 2017-10-06 RX ADMIN — SIMETHICONE 80 MILLIGRAM(S): 80 TABLET, CHEWABLE ORAL at 05:35

## 2017-10-06 RX ADMIN — Medication 2 TABLET(S): at 13:00

## 2017-10-06 RX ADMIN — Medication 100 MILLIGRAM(S): at 13:53

## 2017-10-06 RX ADMIN — Medication 100 MILLIGRAM(S): at 05:35

## 2017-10-06 RX ADMIN — Medication 2 TABLET(S): at 23:31

## 2017-10-06 RX ADMIN — ENOXAPARIN SODIUM 100 MILLIGRAM(S): 100 INJECTION SUBCUTANEOUS at 05:34

## 2017-10-06 RX ADMIN — SIMETHICONE 80 MILLIGRAM(S): 80 TABLET, CHEWABLE ORAL at 13:53

## 2017-10-06 NOTE — PROGRESS NOTE ADULT - SUBJECTIVE AND OBJECTIVE BOX
POD3 pm.  Pt doing very well.  Denies toxic complaint other than previously described calf pain when ambulating to bathroom consistent with her known LLE dvt.  Denies n/v/d, fever, chills, palpitations, chest pain, sob.    Vital Signs Last 24 Hrs  T(C): 36.7 (06 Oct 2017 08:38), Max: 36.8 (05 Oct 2017 21:27)  T(F): 98 (06 Oct 2017 08:38), Max: 98.2 (05 Oct 2017 21:27)  HR: 82 (06 Oct 2017 08:38) (81 - 86)  BP: 105/68 (06 Oct 2017 08:38) (105/68 - 126/73)  BP(mean): --  RR: 17 (06 Oct 2017 08:38) (16 - 17)  SpO2: 96% (06 Oct 2017 08:38) (96% - 99%)  I&O's Summary    05 Oct 2017 07:01  -  06 Oct 2017 07:00  --------------------------------------------------------  IN: 580 mL / OUT: 550 mL / NET: 30 mL      MEDICATIONS  (STANDING):  BUpivacaine liposome 1.3% Injectable (no eMAR) 20 milliLiter(s) Local Injection once  docusate sodium 100 milliGRAM(s) Oral three times a day  enoxaparin Injectable 100 milliGRAM(s) SubCutaneous every 12 hours  influenza   Vaccine 0.5 milliLiter(s) IntraMuscular once  simethicone 80 milliGRAM(s) Chew every 8 hours    MEDICATIONS  (PRN):  acetaminophen 300 mG/codeine 30 mG 1 Tablet(s) Oral every 4 hours PRN Moderate Pain (4 - 6)  acetaminophen 300 mG/codeine 30 mG 2 Tablet(s) Oral every 4 hours PRN Severe Pain (7 - 10)  bisacodyl 5 milliGRAM(s) Oral every 12 hours PRN Constipation  diphenhydrAMINE   Capsule 25 milliGRAM(s) Oral every 6 hours PRN Rash and/or Itching  naloxone Injectable 0.1 milliGRAM(s) IV Push every 3 minutes PRN For ANY of the following changes in patient status:  A. RR LESS THAN 10 breaths per minute, B. Oxygen saturation LESS THAN 90%, C. Sedation score of 6  ondansetron Injectable 4 milliGRAM(s) IV Push every 6 hours PRN Nausea    ROS: as per interval hpi    PE:   A&Ox3, NAD  CTAB  RRR  Abd s/appropriately tender/nd, incision under clean provena vac, +BS  Ext LLE mildly swollen just proximal and distal of knee, no calf pain or swelling the RLE    A/P: POD3 s/p ALEXANDER, BSO.  Aside from dvt, which is being therapeutically anticoagulated, pt continues to recover without complication.  Pt is both clinically and subjectively.    Plan:   FEN: reg diet  Pulm: Albuterol PRN  GI: Dulcolax  Neuro: Tylenol 975mg q8h  DVT ppx: Lovenox 100 BID    Welebir PGY4

## 2017-10-06 NOTE — PROGRESS NOTE ADULT - SUBJECTIVE AND OBJECTIVE BOX
INTERVAL HPI/OVERNIGHT EVENTS:      Vital Signs Last 24 Hrs  T(C): 36.6 (06 Oct 2017 05:32), Max: 36.9 (05 Oct 2017 14:06)  T(F): 97.8 (06 Oct 2017 05:32), Max: 98.5 (05 Oct 2017 14:06)  HR: 81 (06 Oct 2017 05:32) (75 - 86)  BP: 115/75 (06 Oct 2017 05:32) (107/70 - 126/73)  BP(mean): --  RR: 17 (06 Oct 2017 05:32) (16 - 17)  SpO2: 99% (06 Oct 2017 05:32) (96% - 99%)  I&O's Summary    05 Oct 2017 07:01  -  06 Oct 2017 07:00  --------------------------------------------------------  IN: 580 mL / OUT: 550 mL / NET: 30 mL      MEDICATIONS  (STANDING):  BUpivacaine liposome 1.3% Injectable (no eMAR) 20 milliLiter(s) Local Injection once  docusate sodium 100 milliGRAM(s) Oral three times a day  enoxaparin Injectable 100 milliGRAM(s) SubCutaneous every 12 hours  influenza   Vaccine 0.5 milliLiter(s) IntraMuscular once  simethicone 80 milliGRAM(s) Chew every 8 hours    MEDICATIONS  (PRN):  acetaminophen 300 mG/codeine 30 mG 1 Tablet(s) Oral every 4 hours PRN Moderate Pain (4 - 6)  acetaminophen 300 mG/codeine 30 mG 2 Tablet(s) Oral every 4 hours PRN Severe Pain (7 - 10)  bisacodyl 5 milliGRAM(s) Oral every 12 hours PRN Constipation  diphenhydrAMINE   Capsule 25 milliGRAM(s) Oral every 6 hours PRN Rash and/or Itching  naloxone Injectable 0.1 milliGRAM(s) IV Push every 3 minutes PRN For ANY of the following changes in patient status:  A. RR LESS THAN 10 breaths per minute, B. Oxygen saturation LESS THAN 90%, C. Sedation score of 6  ondansetron Injectable 4 milliGRAM(s) IV Push every 6 hours PRN Nausea    ROS: as per interval hpi    PE:  A&Ox3, NAD  CTAB  RRR  Abd s/appropriately tender/nd, incision under cd incision vac, +BS  Ext L mild upper calf/knee swelling, tenderness to palpation, RLE no calf swelling or tenderness    A/P: POD3 s/p ex lap ALEXANDER, BSO, LND.  Pathology negative on uterus and node.  Pt doing very well other than consistent tenderness of LLE related to DVT.    Plan:  FEN: reg diet  Pulm: sattin  on RA, albuterol PRN  CV: none  GI: Dulcolax, flatus/BM +  DVT: Lovenox 100 BID  Heme: f/u thrombophilia/Fe studies results still outstanding    Welebir PGY4

## 2017-10-06 NOTE — CHART NOTE - NSCHARTNOTEFT_GEN_A_CORE
Admitting Diagnosis:   Patient is a 47y old  Female who presents with a chief complaint of Vaginal bleeding (29 Sep 2017 07:33)      PAST MEDICAL & SURGICAL HISTORY:  Uncomplicated asthma, unspecified asthma severity  H/O sinus surgery  H/O dilation and curettage      Current Nutrition Order:   Regular diet (5-Sept-2017)    PO Intake: Good (%) [ x  ]  Fair (50-75%) [   ] Poor (<25%) [   ]    GI Issues: Denies GI distress, no postprandial bloating or distension.    Pain: Pain being managed.    Skin Integrity: Surgical incision.    Medications:  MEDICATIONS  (STANDING):  BUpivacaine liposome 1.3% Injectable (no eMAR) 20 milliLiter(s) Local Injection once  docusate sodium 100 milliGRAM(s) Oral three times a day  enoxaparin Injectable 100 milliGRAM(s) SubCutaneous every 12 hours  influenza   Vaccine 0.5 milliLiter(s) IntraMuscular once  simethicone 80 milliGRAM(s) Chew every 8 hours    MEDICATIONS  (PRN):  acetaminophen 300 mG/codeine 30 mG 1 Tablet(s) Oral every 4 hours PRN Moderate Pain (4 - 6)  acetaminophen 300 mG/codeine 30 mG 2 Tablet(s) Oral every 4 hours PRN Severe Pain (7 - 10)  bisacodyl 5 milliGRAM(s) Oral every 12 hours PRN Constipation  diphenhydrAMINE   Capsule 25 milliGRAM(s) Oral every 6 hours PRN Rash and/or Itching  naloxone Injectable 0.1 milliGRAM(s) IV Push every 3 minutes PRN For ANY of the following changes in patient status:  A. RR LESS THAN 10 breaths per minute, B. Oxygen saturation LESS THAN 90%, C. Sedation score of 6  ondansetron Injectable 4 milliGRAM(s) IV Push every 6 hours PRN Nausea      Weight:  217lb (4-Oct-2017)  210lb Pts reported wt at admission (28-Sep-2017)  Height: 5'5" Weight: 195lbs, IBW 125lbs+/-10%, %%, BMI 32    Weight Change:   New wt taken 10/4 indicating a 7lb wt gain since admission and ~40lb wt gain from UBW. Will continue to trend wts.     Estimated energy needs:   IBW used for calculations as pt >120% of IBW  1415-1698kcal/day (25-30kcal/kg)  57-68g pro/day (1-1.2g/kg)  1415-1698ml fluid/day (25-30ml/kg)    Subjective:   46 y/o POD3 after ALEXANDER, BSO and LND complicated by postoperative left leg DVT. Diet advanced from clears to regular w/ good tolerance. Eating food from outside [facility]. Endorses good appetite, >75%meals. Passing gas, has not had BM since 9/30. On bowel regimen. Denies bloating or distension after meals. New wt taken 10/4 indicating a 7lb wt gain since admission and ~40lb wt gain from UBW. Will continue to trend wts.     Previous Nutrition Diagnosis:  Inadequate oral intake RT diet order clear liquid diet AEB pt meeting <25% estimated needs    Active [   ]  Resolved [ x  ]    If resolved, new PES:   Food and nutrition related knowledge deficit RT lack of prior exposure to education on nutrient needs for post-op recovery AEB pt unaware of increased nutrient needs    Goal:  Pt to recall 2 main concepts from edu (adequate protein sources)    Recommendations:  1) Continue on regular diet  2) Obtain new wt to monitor for further changes  3) Encourage intake at meals    Education: Pt expressed understanding of nutrient needs.    Risk Level: High [   ] Moderate [ x  ] Low [   ]

## 2017-10-07 VITALS
SYSTOLIC BLOOD PRESSURE: 101 MMHG | HEART RATE: 79 BPM | TEMPERATURE: 98 F | RESPIRATION RATE: 16 BRPM | OXYGEN SATURATION: 96 % | DIASTOLIC BLOOD PRESSURE: 60 MMHG

## 2017-10-07 PROCEDURE — 83550 IRON BINDING TEST: CPT

## 2017-10-07 PROCEDURE — 82728 ASSAY OF FERRITIN: CPT

## 2017-10-07 PROCEDURE — 81001 URINALYSIS AUTO W/SCOPE: CPT

## 2017-10-07 PROCEDURE — 96374 THER/PROPH/DIAG INJ IV PUSH: CPT

## 2017-10-07 PROCEDURE — 85025 COMPLETE CBC W/AUTO DIFF WBC: CPT

## 2017-10-07 PROCEDURE — 72197 MRI PELVIS W/O & W/DYE: CPT

## 2017-10-07 PROCEDURE — 80048 BASIC METABOLIC PNL TOTAL CA: CPT

## 2017-10-07 PROCEDURE — 76856 US EXAM PELVIC COMPLETE: CPT

## 2017-10-07 PROCEDURE — 71260 CT THORAX DX C+: CPT

## 2017-10-07 PROCEDURE — 88331 PATH CONSLTJ SURG 1 BLK 1SPC: CPT

## 2017-10-07 PROCEDURE — 86900 BLOOD TYPING SEROLOGIC ABO: CPT

## 2017-10-07 PROCEDURE — 88332 PATH CONSLTJ SURG EA ADD BLK: CPT

## 2017-10-07 PROCEDURE — 85303 CLOT INHIBIT PROT C ACTIVITY: CPT

## 2017-10-07 PROCEDURE — 76830 TRANSVAGINAL US NON-OB: CPT

## 2017-10-07 PROCEDURE — 86923 COMPATIBILITY TEST ELECTRIC: CPT

## 2017-10-07 PROCEDURE — 96375 TX/PRO/DX INJ NEW DRUG ADDON: CPT

## 2017-10-07 PROCEDURE — 99285 EMERGENCY DEPT VISIT HI MDM: CPT | Mod: 25

## 2017-10-07 PROCEDURE — 36415 COLL VENOUS BLD VENIPUNCTURE: CPT

## 2017-10-07 PROCEDURE — 84702 CHORIONIC GONADOTROPIN TEST: CPT

## 2017-10-07 PROCEDURE — 86901 BLOOD TYPING SEROLOGIC RH(D): CPT

## 2017-10-07 PROCEDURE — 87086 URINE CULTURE/COLONY COUNT: CPT

## 2017-10-07 PROCEDURE — P9016: CPT

## 2017-10-07 PROCEDURE — 85027 COMPLETE CBC AUTOMATED: CPT

## 2017-10-07 PROCEDURE — 85301 ANTITHROMBIN III ANTIGEN: CPT

## 2017-10-07 PROCEDURE — 85306 CLOT INHIBIT PROT S FREE: CPT

## 2017-10-07 PROCEDURE — 80053 COMPREHEN METABOLIC PANEL: CPT

## 2017-10-07 PROCEDURE — 74177 CT ABD & PELVIS W/CONTRAST: CPT

## 2017-10-07 PROCEDURE — 85598 HEXAGNAL PHOSPH PLTLT NEUTRL: CPT

## 2017-10-07 PROCEDURE — 36430 TRANSFUSION BLD/BLD COMPNT: CPT

## 2017-10-07 PROCEDURE — A9577: CPT

## 2017-10-07 PROCEDURE — 93005 ELECTROCARDIOGRAM TRACING: CPT

## 2017-10-07 PROCEDURE — 85300 ANTITHROMBIN III ACTIVITY: CPT

## 2017-10-07 PROCEDURE — 86850 RBC ANTIBODY SCREEN: CPT

## 2017-10-07 PROCEDURE — 88307 TISSUE EXAM BY PATHOLOGIST: CPT

## 2017-10-07 PROCEDURE — 88305 TISSUE EXAM BY PATHOLOGIST: CPT

## 2017-10-07 PROCEDURE — 93970 EXTREMITY STUDY: CPT

## 2017-10-07 RX ORDER — ACETAMINOPHEN WITH CODEINE 300MG-30MG
1 TABLET ORAL
Qty: 30 | Refills: 0 | OUTPATIENT
Start: 2017-10-07 | End: 2017-10-12

## 2017-10-07 RX ADMIN — Medication 1 TABLET(S): at 11:42

## 2017-10-07 RX ADMIN — ENOXAPARIN SODIUM 100 MILLIGRAM(S): 100 INJECTION SUBCUTANEOUS at 05:50

## 2017-10-07 RX ADMIN — Medication 1 TABLET(S): at 10:42

## 2017-10-07 RX ADMIN — Medication 2 TABLET(S): at 05:51

## 2017-10-07 RX ADMIN — Medication 2 TABLET(S): at 00:00

## 2017-10-07 RX ADMIN — Medication 2 TABLET(S): at 06:20

## 2017-10-07 NOTE — PROGRESS NOTE ADULT - SUBJECTIVE AND OBJECTIVE BOX
Patient seen for am rounds.  Pain well controlled except for calf pain when ambulating to bathroom consistent with her known LLE dvt.  Denies n/v/d, fever, chills, palpitations, chest pain, sob. No vaginal bleeding.     Vital Signs Last 24 Hrs  T(C): 36.7 (07 Oct 2017 09:00), Max: 37.2 (06 Oct 2017 17:06)  T(F): 98.1 (07 Oct 2017 09:00), Max: 98.9 (06 Oct 2017 17:06)  HR: 79 (07 Oct 2017 09:00) (78 - 94)  BP: 101/60 (07 Oct 2017 09:00) (101/60 - 113/73)  BP(mean): --  RR: 16 (07 Oct 2017 09:00) (16 - 18)  SpO2: 96% (07 Oct 2017 09:00) (95% - 97%)      MEDICATIONS  (STANDING):  BUpivacaine liposome 1.3% Injectable (no eMAR) 20 milliLiter(s) Local Injection once  docusate sodium 100 milliGRAM(s) Oral three times a day  enoxaparin Injectable 100 milliGRAM(s) SubCutaneous every 12 hours  influenza   Vaccine 0.5 milliLiter(s) IntraMuscular once  simethicone 80 milliGRAM(s) Chew every 8 hours    MEDICATIONS  (PRN):  acetaminophen 300 mG/codeine 30 mG 1 Tablet(s) Oral every 4 hours PRN Moderate Pain (4 - 6)  acetaminophen 300 mG/codeine 30 mG 2 Tablet(s) Oral every 4 hours PRN Severe Pain (7 - 10)  bisacodyl 5 milliGRAM(s) Oral every 12 hours PRN Constipation  diphenhydrAMINE   Capsule 25 milliGRAM(s) Oral every 6 hours PRN Rash and/or Itching  naloxone Injectable 0.1 milliGRAM(s) IV Push every 3 minutes PRN For ANY of the following changes in patient status:  A. RR LESS THAN 10 breaths per minute, B. Oxygen saturation LESS THAN 90%, C. Sedation score of 6  ondansetron Injectable 4 milliGRAM(s) IV Push every 6 hours PRN Nausea    ROS: as per interval hpi    PE:   A&Ox3, NAD  CTAB  RRR  Abd s/appropriately tender/nd, incision under clean provena vac, +BS  Ext LLE mildly swollen just proximal and distal of knee, no calf pain or swelling the RLE    A/P: POD4 s/p ALEXANEDR, BSO.  Aside from dvt, which is being therapeutically anticoagulated, pt continues to recover without complication.  Pt is both clinically and subjectively.    -Discharge today  -f/u w/ Dr. Tineo this week  -continue lovenox 100 bid  -pain meds sent to pharmacy  -f/u w/ hematologist

## 2017-10-07 NOTE — PROGRESS NOTE ADULT - PROVIDER SPECIALTY LIST ADULT
GYN
Gyn Onc
OB
OB
GYN
GYN

## 2017-10-09 DIAGNOSIS — N80.0 ENDOMETRIOSIS OF UTERUS: ICD-10-CM

## 2017-10-09 DIAGNOSIS — I82.432 ACUTE EMBOLISM AND THROMBOSIS OF LEFT POPLITEAL VEIN: ICD-10-CM

## 2017-10-09 DIAGNOSIS — D25.1 INTRAMURAL LEIOMYOMA OF UTERUS: ICD-10-CM

## 2017-10-09 DIAGNOSIS — D73.5 INFARCTION OF SPLEEN: ICD-10-CM

## 2017-10-09 DIAGNOSIS — R10.32 LEFT LOWER QUADRANT PAIN: ICD-10-CM

## 2017-10-09 DIAGNOSIS — D62 ACUTE POSTHEMORRHAGIC ANEMIA: ICD-10-CM

## 2017-10-11 LAB
DNA PLOIDY SPEC FC-IMP: SIGNIFICANT CHANGE UP
PTR INTERPRETATION: SIGNIFICANT CHANGE UP

## 2017-10-16 DIAGNOSIS — E66.9 OBESITY, UNSPECIFIED: ICD-10-CM

## 2017-10-16 DIAGNOSIS — J45.909 UNSPECIFIED ASTHMA, UNCOMPLICATED: ICD-10-CM

## 2017-10-16 DIAGNOSIS — Z15.89 GENETIC SUSCEPTIBILITY TO OTHER DISEASE: ICD-10-CM

## 2017-10-16 DIAGNOSIS — R59.0 LOCALIZED ENLARGED LYMPH NODES: ICD-10-CM

## 2018-03-30 ENCOUNTER — LABORATORY RESULT (OUTPATIENT)
Age: 48
End: 2018-03-30

## 2018-03-30 ENCOUNTER — APPOINTMENT (OUTPATIENT)
Dept: UROGYNECOLOGY | Facility: CLINIC | Age: 48
End: 2018-03-30
Payer: COMMERCIAL

## 2018-03-30 PROCEDURE — 51798 US URINE CAPACITY MEASURE: CPT

## 2018-03-30 PROCEDURE — 99204 OFFICE O/P NEW MOD 45 MIN: CPT | Mod: 25

## 2018-04-06 ENCOUNTER — APPOINTMENT (OUTPATIENT)
Dept: UROGYNECOLOGY | Facility: CLINIC | Age: 48
End: 2018-04-06
Payer: COMMERCIAL

## 2018-04-06 PROCEDURE — 52276 CYSTOSCOPY AND TREATMENT: CPT

## 2018-04-17 ENCOUNTER — RESULT REVIEW (OUTPATIENT)
Age: 48
End: 2018-04-17

## 2018-05-04 ENCOUNTER — APPOINTMENT (OUTPATIENT)
Dept: UROGYNECOLOGY | Facility: CLINIC | Age: 48
End: 2018-05-04

## 2018-05-04 ENCOUNTER — APPOINTMENT (OUTPATIENT)
Dept: CT IMAGING | Facility: HOSPITAL | Age: 48
End: 2018-05-04
Payer: COMMERCIAL

## 2018-05-04 ENCOUNTER — LABORATORY RESULT (OUTPATIENT)
Age: 48
End: 2018-05-04

## 2018-05-04 ENCOUNTER — OUTPATIENT (OUTPATIENT)
Dept: OUTPATIENT SERVICES | Facility: HOSPITAL | Age: 48
LOS: 1 days | End: 2018-05-04
Payer: COMMERCIAL

## 2018-05-04 DIAGNOSIS — Z98.890 OTHER SPECIFIED POSTPROCEDURAL STATES: Chronic | ICD-10-CM

## 2018-05-04 PROCEDURE — 71260 CT THORAX DX C+: CPT | Mod: 26

## 2018-05-04 PROCEDURE — 74177 CT ABD & PELVIS W/CONTRAST: CPT | Mod: 26

## 2018-05-04 PROCEDURE — 74177 CT ABD & PELVIS W/CONTRAST: CPT

## 2018-05-04 PROCEDURE — 71260 CT THORAX DX C+: CPT

## 2018-05-15 ENCOUNTER — MESSAGE (OUTPATIENT)
Age: 48
End: 2018-05-15

## 2018-09-11 PROBLEM — J45.909 UNSPECIFIED ASTHMA, UNCOMPLICATED: Chronic | Status: ACTIVE | Noted: 2017-08-09

## 2018-10-12 ENCOUNTER — APPOINTMENT (OUTPATIENT)
Dept: UROGYNECOLOGY | Facility: CLINIC | Age: 48
End: 2018-10-12
Payer: COMMERCIAL

## 2018-10-12 VITALS
HEIGHT: 66 IN | DIASTOLIC BLOOD PRESSURE: 90 MMHG | SYSTOLIC BLOOD PRESSURE: 120 MMHG | BODY MASS INDEX: 33.75 KG/M2 | WEIGHT: 210 LBS

## 2018-10-12 DIAGNOSIS — N39.0 URINARY TRACT INFECTION, SITE NOT SPECIFIED: ICD-10-CM

## 2018-10-12 DIAGNOSIS — N81.6 RECTOCELE: ICD-10-CM

## 2018-10-12 PROCEDURE — 51798 US URINE CAPACITY MEASURE: CPT

## 2018-10-12 PROCEDURE — 99213 OFFICE O/P EST LOW 20 MIN: CPT | Mod: 25

## 2018-12-07 ENCOUNTER — APPOINTMENT (OUTPATIENT)
Dept: BREAST CENTER | Facility: CLINIC | Age: 48
End: 2018-12-07
Payer: COMMERCIAL

## 2018-12-07 VITALS
WEIGHT: 210 LBS | HEIGHT: 66 IN | DIASTOLIC BLOOD PRESSURE: 70 MMHG | TEMPERATURE: 97.1 F | HEART RATE: 99 BPM | SYSTOLIC BLOOD PRESSURE: 118 MMHG | BODY MASS INDEX: 33.75 KG/M2

## 2018-12-07 DIAGNOSIS — Z00.00 ENCOUNTER FOR GENERAL ADULT MEDICAL EXAMINATION W/OUT ABNORMAL FINDINGS: ICD-10-CM

## 2018-12-07 DIAGNOSIS — Z87.828 PERSONAL HISTORY OF OTHER (HEALED) PHYSICAL INJURY AND TRAUMA: ICD-10-CM

## 2018-12-07 DIAGNOSIS — Z87.09 PERSONAL HISTORY OF OTHER DISEASES OF THE RESPIRATORY SYSTEM: ICD-10-CM

## 2018-12-07 DIAGNOSIS — T82.868A THROMBOSIS DUE VASCULAR PROSTHETIC DEVICES, IMPLANTS AND GRAFTS, INITIAL ENCOUNTER: ICD-10-CM

## 2018-12-07 DIAGNOSIS — N80.0 ENDOMETRIOSIS OF UTERUS: ICD-10-CM

## 2018-12-07 DIAGNOSIS — Z86.718 PERSONAL HISTORY OF OTHER VENOUS THROMBOSIS AND EMBOLISM: ICD-10-CM

## 2018-12-07 PROCEDURE — 99213 OFFICE O/P EST LOW 20 MIN: CPT

## 2019-10-30 ENCOUNTER — APPOINTMENT (OUTPATIENT)
Dept: OBGYN | Facility: CLINIC | Age: 49
End: 2019-10-30
Payer: COMMERCIAL

## 2019-10-30 VITALS
BODY MASS INDEX: 36.64 KG/M2 | DIASTOLIC BLOOD PRESSURE: 80 MMHG | WEIGHT: 228 LBS | SYSTOLIC BLOOD PRESSURE: 125 MMHG | HEIGHT: 66 IN

## 2019-10-30 PROCEDURE — 99386 PREV VISIT NEW AGE 40-64: CPT

## 2019-10-30 PROCEDURE — 81002 URINALYSIS NONAUTO W/O SCOPE: CPT

## 2019-11-01 LAB — BACTERIA UR CULT: NORMAL

## 2019-11-06 LAB — CYTOLOGY CVX/VAG DOC THIN PREP: NORMAL

## 2019-12-19 NOTE — DIETITIAN INITIAL EVALUATION ADULT. - NS FNS REASON FOR WEIGHT CHANG
Quality 110: Preventive Care And Screening: Influenza Immunization: Influenza Immunization Administered during Influenza season Quality 111:Pneumonia Vaccination Status For Older Adults: Pneumococcal Vaccination Previously Received Detail Level: Detailed other (specify)/pt reports once she got sick-distension and tissue

## 2020-08-18 ENCOUNTER — LABORATORY RESULT (OUTPATIENT)
Age: 50
End: 2020-08-18

## 2020-08-19 ENCOUNTER — APPOINTMENT (OUTPATIENT)
Dept: OBGYN | Facility: CLINIC | Age: 50
End: 2020-08-19
Payer: COMMERCIAL

## 2020-08-19 VITALS
BODY MASS INDEX: 35.36 KG/M2 | DIASTOLIC BLOOD PRESSURE: 60 MMHG | HEIGHT: 66 IN | SYSTOLIC BLOOD PRESSURE: 100 MMHG | WEIGHT: 220 LBS

## 2020-08-19 PROCEDURE — 99396 PREV VISIT EST AGE 40-64: CPT

## 2020-08-19 PROCEDURE — 36415 COLL VENOUS BLD VENIPUNCTURE: CPT

## 2020-08-21 LAB
ANION GAP SERPL CALC-SCNC: 13 MMOL/L
BASOPHILS # BLD AUTO: 0.06 K/UL
BASOPHILS NFR BLD AUTO: 0.8 %
BUN SERPL-MCNC: 13 MG/DL
C TRACH RRNA SPEC QL NAA+PROBE: NOT DETECTED
CALCIUM SERPL-MCNC: 9.8 MG/DL
CANDIDA VAG CYTO: NOT DETECTED
CHLORIDE SERPL-SCNC: 102 MMOL/L
CO2 SERPL-SCNC: 26 MMOL/L
CREAT SERPL-MCNC: 1.07 MG/DL
EOSINOPHIL # BLD AUTO: 0.19 K/UL
EOSINOPHIL NFR BLD AUTO: 2.6 %
ESTRADIOL SERPL-MCNC: 23 PG/ML
FSH SERPL-MCNC: 47.1 IU/L
G VAGINALIS+PREV SP MTYP VAG QL MICRO: NOT DETECTED
GLUCOSE SERPL-MCNC: 98 MG/DL
HBV SURFACE AG SER QL: NONREACTIVE
HCG SERPL-MCNC: 1 MIU/ML
HCT VFR BLD CALC: 42 %
HCV AB SER QL: NONREACTIVE
HCV S/CO RATIO: 0.22 S/CO
HGB BLD-MCNC: 12.8 G/DL
HSV 1+2 IGG SER IA-IMP: NEGATIVE
HSV 1+2 IGG SER IA-IMP: POSITIVE
HSV1 IGG SER QL: 20.5 INDEX
HSV2 IGG SER QL: 0.17 INDEX
IMM GRANULOCYTES NFR BLD AUTO: 0.3 %
LH SERPL-ACNC: 33.6 IU/L
LYMPHOCYTES # BLD AUTO: 2.66 K/UL
LYMPHOCYTES NFR BLD AUTO: 36.1 %
MAN DIFF?: NORMAL
MCHC RBC-ENTMCNC: 27.1 PG
MCHC RBC-ENTMCNC: 30.5 GM/DL
MCV RBC AUTO: 88.8 FL
MONOCYTES # BLD AUTO: 0.57 K/UL
MONOCYTES NFR BLD AUTO: 7.7 %
N GONORRHOEA RRNA SPEC QL NAA+PROBE: NOT DETECTED
NEUTROPHILS # BLD AUTO: 3.87 K/UL
NEUTROPHILS NFR BLD AUTO: 52.5 %
PLATELET # BLD AUTO: 435 K/UL
POTASSIUM SERPL-SCNC: 5.1 MMOL/L
PROGEST SERPL-MCNC: 0.2 NG/ML
PROLACTIN SERPL-MCNC: 7.3 NG/ML
RBC # BLD: 4.73 M/UL
RBC # FLD: 15.3 %
SODIUM SERPL-SCNC: 141 MMOL/L
SOURCE AMPLIFICATION: NORMAL
T PALLIDUM AB SER QL IA: NEGATIVE
T VAGINALIS VAG QL WET PREP: NOT DETECTED
T3 SERPL-MCNC: 118 NG/DL
T3FREE SERPL-MCNC: 3.08 PG/ML
T4 FREE SERPL-MCNC: 1.1 NG/DL
T4 SERPL-MCNC: 6.2 UG/DL
TSH SERPL-ACNC: 1.09 UIU/ML
WBC # FLD AUTO: 7.37 K/UL

## 2020-08-24 LAB
FIBRINOGEN AG PPP IA-MCNC: 211 MG/DL
HIV1+2 AB SPEC QL IA.RAPID: NONREACTIVE
HSV1 IGM SER QL: NORMAL TITER
HSV2 AB FLD-ACNC: NORMAL TITER

## 2020-09-01 LAB
CYTOLOGY CVX/VAG DOC THIN PREP: NORMAL
SARS-COV-2 IGG SERPL IA-ACNC: <0.1 INDEX
SARS-COV-2 IGG SERPL QL IA: NEGATIVE

## 2021-03-24 ENCOUNTER — APPOINTMENT (OUTPATIENT)
Dept: OBGYN | Facility: CLINIC | Age: 51
End: 2021-03-24
Payer: COMMERCIAL

## 2021-03-24 VITALS — BODY MASS INDEX: 36.96 KG/M2 | DIASTOLIC BLOOD PRESSURE: 90 MMHG | SYSTOLIC BLOOD PRESSURE: 130 MMHG | WEIGHT: 229 LBS

## 2021-03-24 PROCEDURE — 99072 ADDL SUPL MATRL&STAF TM PHE: CPT

## 2021-03-24 PROCEDURE — 36415 COLL VENOUS BLD VENIPUNCTURE: CPT

## 2021-03-24 PROCEDURE — 99396 PREV VISIT EST AGE 40-64: CPT

## 2021-03-24 PROCEDURE — 87075 CULTR BACTERIA EXCEPT BLOOD: CPT | Mod: NC

## 2021-03-31 LAB
C TRACH RRNA SPEC QL NAA+PROBE: NOT DETECTED
CYTOLOGY CVX/VAG DOC THIN PREP: NORMAL
HBV SURFACE AG SER QL: NONREACTIVE
HCV AB SER QL: NONREACTIVE
HCV S/CO RATIO: 0.24 S/CO
HIV1+2 AB SPEC QL IA.RAPID: NONREACTIVE
HSV 1+2 IGG SER IA-IMP: NEGATIVE
HSV 1+2 IGG SER IA-IMP: POSITIVE
HSV1 IGG SER QL: 17.3 INDEX
HSV1 IGM SER QL: NORMAL TITER
HSV2 AB FLD-ACNC: NORMAL TITER
HSV2 IGG SER QL: 0.14 INDEX
N GONORRHOEA RRNA SPEC QL NAA+PROBE: NOT DETECTED
SOURCE AMPLIFICATION: NORMAL
SOURCE AMPLIFICATION: NORMAL
T PALLIDUM AB SER QL IA: NEGATIVE
T VAGINALIS RRNA SPEC QL NAA+PROBE: NOT DETECTED

## 2021-06-21 NOTE — PHYSICAL EXAM
[Appropriately responsive] : appropriately responsive [Alert] : alert [No Acute Distress] : no acute distress [No Lymphadenopathy] : no lymphadenopathy [Regular Rate Rhythm] : regular rate rhythm [No Murmurs] : no murmurs [Clear to Auscultation B/L] : clear to auscultation bilaterally [Soft] : soft [Non-tender] : non-tender [Non-distended] : non-distended [No HSM] : No HSM [No Lesions] : no lesions [No Mass] : no mass [Oriented x3] : oriented x3 [Examination Of The Breasts] : a normal appearance [No Masses] : no breast masses were palpable [Labia Majora] : normal [Labia Minora] : normal [Normal] : normal [Uterine Adnexae] : normal [FreeTextEntry6] : PENDULOUS AND DENSE BL W/O DOMINANT MASSES [FreeTextEntry7] : OBESE

## 2021-06-21 NOTE — HISTORY OF PRESENT ILLNESS
[N] : Patient is not sexually active [FreeTextEntry1] : RAINA LAZARUS IS A 51 year OLD WHO PRESENTS FOR AN ANNUAL GYN EXAM.  SHE IS WITHOUT COMPLAINTS.\par MS. LAZARUS REQUESTS SCREENING FOR STI's. : Yes

## 2022-02-16 ENCOUNTER — APPOINTMENT (OUTPATIENT)
Dept: OBGYN | Facility: CLINIC | Age: 52
End: 2022-02-16
Payer: COMMERCIAL

## 2022-02-16 VITALS
SYSTOLIC BLOOD PRESSURE: 130 MMHG | HEIGHT: 66 IN | WEIGHT: 234 LBS | BODY MASS INDEX: 37.61 KG/M2 | DIASTOLIC BLOOD PRESSURE: 80 MMHG

## 2022-02-16 DIAGNOSIS — Z90.710 ACQUIRED ABSENCE OF BOTH CERVIX AND UTERUS: ICD-10-CM

## 2022-02-16 PROCEDURE — 99396 PREV VISIT EST AGE 40-64: CPT

## 2022-02-16 PROCEDURE — 36415 COLL VENOUS BLD VENIPUNCTURE: CPT

## 2022-02-16 NOTE — PHYSICAL EXAM
[Examination Of The Breasts] : a normal appearance [No Masses] : no breast masses were palpable [Chaperone Present] : A chaperone was present in the examining room during all aspects of the physical examination [Appropriately responsive] : appropriately responsive [Alert] : alert [No Acute Distress] : no acute distress [No Lymphadenopathy] : no lymphadenopathy [Regular Rate Rhythm] : regular rate rhythm [No Murmurs] : no murmurs [Clear to Auscultation B/L] : clear to auscultation bilaterally [Soft] : soft [Non-tender] : non-tender [Non-distended] : non-distended [No HSM] : No HSM [No Lesions] : no lesions [No Mass] : no mass [Oriented x3] : oriented x3 [Labia Majora] : normal [Labia Minora] : normal [Normal] : normal [Absent] : absent [Uterine Adnexae] : normal

## 2022-02-17 ENCOUNTER — NON-APPOINTMENT (OUTPATIENT)
Age: 52
End: 2022-02-17

## 2022-02-20 ENCOUNTER — NON-APPOINTMENT (OUTPATIENT)
Age: 52
End: 2022-02-20

## 2022-02-23 ENCOUNTER — NON-APPOINTMENT (OUTPATIENT)
Age: 52
End: 2022-02-23

## 2022-03-07 LAB
ANTI-MUELLERIAN HORMONE: <0.015 NG/ML
C TRACH RRNA SPEC QL NAA+PROBE: NOT DETECTED
CHOLEST SERPL-MCNC: 184 MG/DL
COVID-19 NUCLEOCAPSID  GAM ANTIBODY INTERPRETATION: POSITIVE
CYTOLOGY CVX/VAG DOC THIN PREP: NORMAL
ESTRADIOL SERPL-MCNC: 12 PG/ML
FSH SERPL-MCNC: 46.2 IU/L
HBV SURFACE AG SER QL: NONREACTIVE
HCV AB SER QL: NONREACTIVE
HCV S/CO RATIO: 0.18 S/CO
HIV1+2 AB SPEC QL IA.RAPID: NONREACTIVE
HSV 1+2 IGG SER IA-IMP: NEGATIVE
HSV 1+2 IGG SER IA-IMP: POSITIVE
HSV1 IGG SER QL: 14.2 INDEX
HSV1 IGM SER QL: NEGATIVE
HSV2 AB FLD-ACNC: NEGATIVE
HSV2 IGG SER QL: 0.09 INDEX
LH SERPL-ACNC: 31.4 IU/L
N GONORRHOEA RRNA SPEC QL NAA+PROBE: NOT DETECTED
PROGEST SERPL-MCNC: 0.2 NG/ML
PROLACTIN SERPL-MCNC: 9.4 NG/ML
SARS-COV-2 AB SERPL QL IA: 18.7 INDEX
SOURCE AMPLIFICATION: NORMAL
SOURCE TP AMPLIFICATION: NORMAL
T PALLIDUM AB SER QL IA: NEGATIVE
T VAGINALIS RRNA SPEC QL NAA+PROBE: NOT DETECTED
T3 SERPL-MCNC: 158 NG/DL
T3FREE SERPL-MCNC: 3.96 PG/ML
T4 FREE SERPL-MCNC: 1.1 NG/DL
T4 SERPL-MCNC: 6.8 UG/DL
TSH SERPL-ACNC: 2.43 UIU/ML

## 2022-04-08 ENCOUNTER — NON-APPOINTMENT (OUTPATIENT)
Age: 52
End: 2022-04-08

## 2022-05-04 ENCOUNTER — NON-APPOINTMENT (OUTPATIENT)
Age: 52
End: 2022-05-04

## 2022-05-24 ENCOUNTER — APPOINTMENT (OUTPATIENT)
Dept: BREAST CENTER | Facility: CLINIC | Age: 52
End: 2022-05-24
Payer: COMMERCIAL

## 2022-05-24 VITALS
OXYGEN SATURATION: 97 % | DIASTOLIC BLOOD PRESSURE: 85 MMHG | WEIGHT: 235 LBS | HEART RATE: 89 BPM | HEIGHT: 65 IN | BODY MASS INDEX: 39.15 KG/M2 | SYSTOLIC BLOOD PRESSURE: 131 MMHG

## 2022-05-24 DIAGNOSIS — N63.20 UNSPECIFIED LUMP IN THE LEFT BREAST, UNSPECIFIED QUADRANT: ICD-10-CM

## 2022-05-24 DIAGNOSIS — N63.10 UNSPECIFIED LUMP IN THE RIGHT BREAST, UNSPECIFIED QUADRANT: ICD-10-CM

## 2022-05-24 PROCEDURE — 99203 OFFICE O/P NEW LOW 30 MIN: CPT

## 2022-05-24 RX ORDER — ASPIRIN 81 MG
81 TABLET, DELAYED RELEASE (ENTERIC COATED) ORAL
Refills: 0 | Status: ACTIVE | COMMUNITY

## 2022-05-24 RX ORDER — ENOXAPARIN SODIUM 80 MG/.8ML
80 INJECTION SUBCUTANEOUS
Refills: 0 | Status: ACTIVE | COMMUNITY

## 2022-05-24 RX ORDER — NITROFURANTOIN (MONOHYDRATE/MACROCRYSTALS) 25; 75 MG/1; MG/1
100 CAPSULE ORAL TWICE DAILY
Qty: 14 | Refills: 0 | Status: COMPLETED | COMMUNITY
Start: 2020-07-02 | End: 2022-05-24

## 2022-05-24 RX ORDER — CHROMIUM 200 MCG
TABLET ORAL
Refills: 0 | Status: ACTIVE | COMMUNITY

## 2022-10-25 NOTE — PAST MEDICAL HISTORY
[Menarche Age ____] : age at menarche was [unfilled] [Menopause Age____] : age at menopause was [unfilled] [Total Preg ___] : G[unfilled] [Live Births ___] : P[unfilled]  [Abortions ___] : Abortions:[unfilled] [Age At Live Birth ___] : Age at live birth: [unfilled] [History of Hormone Replacement Treatment] : has no history of hormone replacement treatment [FreeTextEntry2] : 1 miscarriage [FreeTextEntry6] : no [FreeTextEntry7] : yes [FreeTextEntry8] : yes

## 2022-10-25 NOTE — HISTORY OF PRESENT ILLNESS
[FreeTextEntry1] : 51 yo female referred by Dr. Genevieve Tineo (OB/GYN) presents for a family history of breast cancer. Patient states she can palpate "softer tissue" in her right breast at her incision site, she also complains of occasional pain at the site. Denies palpable abnormalities, no skin changes/dimpling, no nipple discharge bilaterally. \par \par She has a family history of breast cancer with her maternal grandmother and maternal great grandmother. 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 \par \par She has a history of a right breast excisional biopsy in 2007 for a fibroadenoma. \par \par WILLIAM lifetime risk is 11.6%; she has a family history of breast cancer in her maternal grandmother and maternal great grandmother. \par \par \par

## 2022-10-25 NOTE — PHYSICAL EXAM
[Normocephalic] : normocephalic [Atraumatic] : atraumatic [Supple] : supple [No Supraclavicular Adenopathy] : no supraclavicular adenopathy [Examined in the supine and seated position] : examined in the supine and seated position [No dominant masses] : no dominant masses in right breast  [No dominant masses] : no dominant masses left breast [No Nipple Retraction] : no left nipple retraction [No Nipple Discharge] : no left nipple discharge [No Axillary Lymphadenopathy] : no left axillary lymphadenopathy [No Edema] : no edema [No Rashes] : no rashes [No Ulceration] : no ulceration [de-identified] : right breast with well healed upper outer incision

## 2022-12-21 ENCOUNTER — NON-APPOINTMENT (OUTPATIENT)
Age: 52
End: 2022-12-21
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
## 2023-02-03 ENCOUNTER — APPOINTMENT (OUTPATIENT)
Dept: BREAST CENTER | Facility: CLINIC | Age: 53
End: 2023-02-03
Payer: COMMERCIAL

## 2023-02-03 ENCOUNTER — APPOINTMENT (OUTPATIENT)
Dept: BREAST CENTER | Facility: CLINIC | Age: 53
End: 2023-02-03

## 2023-02-03 VITALS
BODY MASS INDEX: 39.55 KG/M2 | WEIGHT: 237.38 LBS | DIASTOLIC BLOOD PRESSURE: 90 MMHG | SYSTOLIC BLOOD PRESSURE: 141 MMHG | HEIGHT: 65 IN | HEART RATE: 83 BPM

## 2023-02-03 DIAGNOSIS — Z87.898 PERSONAL HISTORY OF OTHER SPECIFIED CONDITIONS: ICD-10-CM

## 2023-02-03 PROCEDURE — 99214 OFFICE O/P EST MOD 30 MIN: CPT

## 2023-02-03 RX ORDER — COLD-HOT PACK
EACH MISCELLANEOUS
Refills: 0 | Status: ACTIVE | COMMUNITY

## 2023-02-03 RX ORDER — ASCORBIC ACID 500 MG
TABLET ORAL
Refills: 0 | Status: ACTIVE | COMMUNITY

## 2023-02-03 RX ORDER — ELECTROLYTES/DEXTROSE
SOLUTION, ORAL ORAL
Refills: 0 | Status: ACTIVE | COMMUNITY

## 2023-02-03 NOTE — PHYSICAL EXAM
[EOMI] : extra ocular movement intact [No Cervical Adenopathy] : no cervical adenopathy [de-identified] : Bilateral breast/axilla/supraclavicular area: No masses, discharge, or adenopathy\par

## 2023-02-03 NOTE — REASON FOR VISIT
Problem: Patient Care Overview (Adult)  Goal: Plan of Care Review  Outcome: Ongoing (interventions implemented as appropriate)    03/05/17 0226   Coping/Psychosocial Response Interventions   Plan Of Care Reviewed With patient   Patient Care Overview   Progress progress toward functional goals as expected   Outcome Evaluation   Outcome Summary/Follow up Plan IV abx; Iv and PO mecication given for pain; on K protocol ; going to continue to monitor labs, blood pressure and pain       Goal: Adult Individualization and Mutuality  Outcome: Ongoing (interventions implemented as appropriate)    Problem: Pneumonia (Adult)  Goal: Signs and Symptoms of Listed Potential Problems Will be Absent or Manageable (Pneumonia)  Outcome: Ongoing (interventions implemented as appropriate)    Problem: Fall Risk (Adult)  Goal: Identify Related Risk Factors and Signs and Symptoms  Outcome: Outcome(s) achieved Date Met:  03/05/17  Goal: Absence of Falls  Outcome: Ongoing (interventions implemented as appropriate)       [Follow-Up: _____] : a [unfilled] follow-up visit

## 2023-02-03 NOTE — PAST MEDICAL HISTORY
[History of Hormone Replacement Treatment] : has no history of hormone replacement treatment [FreeTextEntry2] : 1 miscarriage [FreeTextEntry6] : no [FreeTextEntry7] : yes [FreeTextEntry8] : yes

## 2023-02-03 NOTE — REVIEW OF SYSTEMS
[Cough] : cough [Fever] : no fever [Chills] : no chills [Shortness Of Breath] : no shortness of breath [Skin Lesions] : no skin lesions [Skin Wound] : no skin wound

## 2023-02-03 NOTE — HISTORY OF PRESENT ILLNESS
[FreeTextEntry1] : Patient is a 51yo F here for breast cancer screening. She was previously under the care of Dr. Persaud. At that time, she was seen for R "softer tissue" and occasional pain at incision site. She has hx of R excisional bx for FA in 2007 (age 36). She has fhx of breast cancer in maternal grandmother (age 45) and maternal great grandmother (unknown age). Patient is BRCA negative with VUS in PTEN (tested 2016). Patient denies palpable masses, skin changes, or nipple discharge bilaterally.\par \par WILLIAM lifetime risk is 11.6%\par \par 3/21/22: B/l MG & US- scattered fibroglandular. Benign findings. JAYA. BI-RADS 2\par 2/3/23: B/l MG & US- scattered fibroglandular. R focal asymmetry medial 3-4FN. US- R 0.4cm complex cystic mass with surrounding echogenic tissue likely c/w MG finding 1:00 4FN (rec US bx w/ post MG). BI-RADS 4

## 2023-02-10 ENCOUNTER — APPOINTMENT (OUTPATIENT)
Dept: OBGYN | Facility: CLINIC | Age: 53
End: 2023-02-10
Payer: COMMERCIAL

## 2023-02-10 VITALS
DIASTOLIC BLOOD PRESSURE: 83 MMHG | SYSTOLIC BLOOD PRESSURE: 132 MMHG | BODY MASS INDEX: 39.11 KG/M2 | WEIGHT: 235 LBS | OXYGEN SATURATION: 97 % | HEART RATE: 98 BPM

## 2023-02-10 DIAGNOSIS — E66.9 OBESITY, UNSPECIFIED: ICD-10-CM

## 2023-02-10 DIAGNOSIS — Z11.3 ENCOUNTER FOR SCREENING FOR INFECTIONS WITH A PREDOMINANTLY SEXUAL MODE OF TRANSMISSION: ICD-10-CM

## 2023-02-10 PROCEDURE — 36415 COLL VENOUS BLD VENIPUNCTURE: CPT

## 2023-02-10 PROCEDURE — 99396 PREV VISIT EST AGE 40-64: CPT

## 2023-02-10 NOTE — HISTORY OF PRESENT ILLNESS
[Patient reported PAP Smear was normal] : Patient reported PAP Smear was normal [Patient would like to be screened for STIs] : Patient would like to be screened for STIs [Currently Active] : currently active [Men] : men [No] : No [Mammogramdate] : 2/3/2023 [BreastSonogramDate] : 2/3/2023 [PapSmeardate] : 2/16/2022 [BoneDensityDate] : 3/21/2022 [FreeTextEntry1] : Hysterectomy

## 2023-02-10 NOTE — PHYSICAL EXAM
[Appropriately responsive] : appropriately responsive [Alert] : alert [No Acute Distress] : no acute distress [No Lymphadenopathy] : no lymphadenopathy [Regular Rate Rhythm] : regular rate rhythm [No Murmurs] : no murmurs [Clear to Auscultation B/L] : clear to auscultation bilaterally [Soft] : soft [Non-tender] : non-tender [Non-distended] : non-distended [No HSM] : No HSM [No Lesions] : no lesions [No Mass] : no mass [Oriented x3] : oriented x3 [Examination Of The Breasts] : a normal appearance [No Masses] : no breast masses were palpable [Labia Majora] : normal [Labia Minora] : normal [Normal] : normal [Uterine Adnexae] : normal [FreeTextEntry6] : pendulous and dense BL [FreeTextEntry7] : obese

## 2023-02-13 ENCOUNTER — RESULT REVIEW (OUTPATIENT)
Age: 53
End: 2023-02-13

## 2023-02-13 LAB
C TRACH RRNA SPEC QL NAA+PROBE: NOT DETECTED
HBV SURFACE AG SER QL: NONREACTIVE
HCV AB SER QL: NONREACTIVE
HCV S/CO RATIO: 0.13 S/CO
HIV1+2 AB SPEC QL IA.RAPID: NONREACTIVE
HSV 1+2 IGG SER IA-IMP: NEGATIVE
HSV 1+2 IGG SER IA-IMP: POSITIVE
HSV1 IGG SER QL: 11.3 INDEX
HSV2 IGG SER QL: 0.08 INDEX
N GONORRHOEA RRNA SPEC QL NAA+PROBE: NOT DETECTED
SOURCE AMPLIFICATION: NORMAL
SOURCE AMPLIFICATION: NORMAL
T PALLIDUM AB SER QL IA: NEGATIVE
T VAGINALIS RRNA SPEC QL NAA+PROBE: NOT DETECTED

## 2023-02-15 ENCOUNTER — NON-APPOINTMENT (OUTPATIENT)
Age: 53
End: 2023-02-15

## 2023-02-15 LAB — CYTOLOGY CVX/VAG DOC THIN PREP: NORMAL

## 2023-02-21 LAB
HSV1 IGM SER QL: NEGATIVE
HSV2 AB FLD-ACNC: NEGATIVE

## 2023-06-09 ENCOUNTER — APPOINTMENT (OUTPATIENT)
Dept: BREAST CENTER | Facility: CLINIC | Age: 53
End: 2023-06-09
Payer: COMMERCIAL

## 2023-06-09 VITALS
SYSTOLIC BLOOD PRESSURE: 140 MMHG | WEIGHT: 235 LBS | HEART RATE: 76 BPM | DIASTOLIC BLOOD PRESSURE: 79 MMHG | HEIGHT: 65 IN | BODY MASS INDEX: 39.15 KG/M2

## 2023-06-09 DIAGNOSIS — Z80.3 FAMILY HISTORY OF MALIGNANT NEOPLASM OF BREAST: ICD-10-CM

## 2023-06-09 DIAGNOSIS — Z78.9 OTHER SPECIFIED HEALTH STATUS: ICD-10-CM

## 2023-06-09 PROCEDURE — 99213 OFFICE O/P EST LOW 20 MIN: CPT

## 2023-06-09 NOTE — HISTORY OF PRESENT ILLNESS
[FreeTextEntry1] : Patient is a 53yo F here for short term follow up. Patient underwent R US bx of 0.5cm complex cystic mass 2/13/23 yielding fibrocystic changes. Previously seen by Dr. Persaud for R "softer tissue" and occasional pain at incision site. She has hx of R excisional bx for FA in 2007 (age 36). She has fhx of breast cancer in maternal grandmother (age 45) and maternal great grandmother (unknown age). Patient is BRCA negative with VUS in PTEN (tested 2016). Patient denies palpable masses, skin changes, or nipple discharge bilaterally.\par \par WILLIAM lifetime risk is 11.6%\par \par 3/21/22: B/l MG & US- scattered fibroglandular. Benign findings. JAYA. BI-RADS 2\par 2/3/23: B/l MG & US- scattered fibroglandular. R focal asymmetry medial 3-4FN. US- R 0.4cm complex cystic mass with surrounding echogenic tissue likely c/w MG finding 1:00 4FN (rec US bx w/ post MG). BI-RADS 4\par 2/13/23: R US bx 0.5cm complex cystic mass 1:00 4FN- Fibrocystic changes including apocrine metaplasia. Benign, concordant. Recc 6 month f/u R MG/US\par 6/9/23: R MG & US- scattered fibroglandular. R benign focal asymmetry anterior medial. R stable surgical changes. US- R 1.2cm stable bx mass 1:00 1FN. Rec annual screening. BI-RADS 2

## 2024-02-05 ENCOUNTER — APPOINTMENT (OUTPATIENT)
Dept: BREAST CENTER | Facility: CLINIC | Age: 54
End: 2024-02-05
Payer: SELF-PAY

## 2024-02-05 ENCOUNTER — APPOINTMENT (OUTPATIENT)
Dept: HEMATOLOGY ONCOLOGY | Facility: CLINIC | Age: 54
End: 2024-02-05

## 2024-02-05 VITALS
DIASTOLIC BLOOD PRESSURE: 81 MMHG | HEIGHT: 65 IN | HEART RATE: 87 BPM | BODY MASS INDEX: 37.82 KG/M2 | WEIGHT: 227 LBS | SYSTOLIC BLOOD PRESSURE: 125 MMHG

## 2024-02-05 DIAGNOSIS — R92.8 OTHER ABNORMAL AND INCONCLUSIVE FINDINGS ON DIAGNOSTIC IMAGING OF BREAST: ICD-10-CM

## 2024-02-05 DIAGNOSIS — N64.4 MASTODYNIA: ICD-10-CM

## 2024-02-05 DIAGNOSIS — Z12.39 ENCOUNTER FOR OTHER SCREENING FOR MALIGNANT NEOPLASM OF BREAST: ICD-10-CM

## 2024-02-05 PROCEDURE — 99214 OFFICE O/P EST MOD 30 MIN: CPT

## 2024-02-05 NOTE — DISCUSSION/SUMMARY
[FreeTextEntry1] : REASON FOR CONSULT Raina Lazarus is a 53-year-old female who was referred by Dr. Enid Manzanares for cancer genetic counseling and risk assessment due to a family history of cancer.   RELEVANT MEDICAL HISTORY Ms. Lazarus is a healthy individual who has never had cancer. She has a family history of cancer, see below.  Of note, Ms. Lazarus pursued genetic testing using the BreastNext panel offered at [facility] ordered by Dr. Dinora Rock at [facility]. 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 This variant is now called Benign by [employer], [employer], and Labtok tok tok and Likely Benign by the ClinGen PTEN Variant Curation Expert Panel. [facility] has also reclassified this PTEN variant to Likely Benign in 2019. A reclassification notice has been uploaded to her chart. Report was available for review at today's appointment.   Ms. Lazarus had previous genetic testing using the Comprehensive BRACAnalysis offered at [facility] in  ordered by Dr. Genevieve Tineo. Results revealed a favor polymorphism (Benign) in the BRCA2 gene ([id number]). Report was available for review at today's appointment.   OTHER MEDICAL AND SURGICAL HISTORY: -	Medical History: asthma, sinus problems -	Surgical History: hysterectomy and bilateral salpingo-oophorectomy (at 47 years old; due to bleeding), knee surgery, meniscus repair, D&C, right breast excisional biopsy (; for fibroadenoma)  PAST OB/GYN HISTORY: Obstetrical History:  Age at Menarche: 15 Menopausal with LMP at age 47 (surgically induced menopause)  Age at First Live Birth: 24 Oral Contraceptive Use: Yes, intermittent use for approximately 10 years Other Contraceptive Use: transdermal patch for approximately 3 years Hormone Replacement Therapy: No  CANCER SCREENING HISTORY:   Breast:  -	Mammography: last 2024, normal -	Sonography: last 2024, normal  -	MRI: No -	Biopsies: 2023-Right, fibrocystic changes including apocrine metaplasia; -Right breast excisional biopsy, reported fibroadenoma; Patient reports undergoing another breast biopsy which was benign.  GYN: -	Pelvic Examination: last 2023, normal -	Sonography: Patient reports undergoing a TVUS in the past due to a cyst, she reports adenomyosis was identified.  -	CA-125: No Colon: -	Colonoscopy: last 4 years ago, reportedly normal. Next colonoscopy was recommended in 10 years. Patient denies a history of colorectal polyps on previous colonoscopies. -	Upper Endoscopy: last 4 years ago, reportedly normal  Skin:   -	FBSE: last , reportedly normal  -	Lesions biopsied/removed: No  SOCIAL HISTORY: -	Tobacco-product use: No  FAMILY HISTORY: Maternal ancestry was reported as American and paternal ancestry was reported as Macedonian and 1% Ashkenazi Mormonism. Consanguinity was denied. A detailed family history of cancer was ascertained. Relevant diagnoses are detailed below and in the scanned pedigree.   To Ms. Lazarus's knowledge, no one in the family has had germline testing for cancer susceptibility.   	 	RISK ASSESSMENT: Ms. Lazarus's family history of breast cancer is suggestive of an inherited predisposition to breast cancer and related cancers. She already has undergone comprehensive genetic testing for genes associated with breast cancer at this time and was negative. Ms. Lazarus's family history of colorectal cancer and stomach cancer is suggestive of an inherited predisposition to colorectal cancer and/or stomach cancer. We discussed the option of additional genetic testing for genes associated with colorectal cancer and stomach cancer.  We discussed the risks, benefits and limitations, and implications of genetic testing. We also discussed the psychosocial implications of genetic testing. Possible test results were reviewed with Ms. Lazarus, along with associated medical management options. The Genetic Information Non-discrimination Act ([place]) was also reviewed.   Following our discussion, Ms. Lazarus deferred additional genetic testing as she would like to think about the cost further. She was made aware that we remain available to her should she wish to pursue genetic testing at any point in the future.  PLAN:  1.	Ms. Lazarus deferred genetic testing today. She will recontact Cancer Genetics if she would like to pursue genetic testing in the future.  For any additional questions please call [facility] at [phone].    Loren Dobson MS, [facility] Genetic Counselor, Cancer Genetics   CC:  Dr. Enid Manzanares

## 2024-02-05 NOTE — PHYSICAL EXAM
[Normocephalic] : normocephalic [EOMI] : extra ocular movement intact [Supple] : supple [No Supraclavicular Adenopathy] : no supraclavicular adenopathy [No Cervical Adenopathy] : no cervical adenopathy [de-identified] : Bilateral breast/axilla/supraclavicular area: No masses, discharge, or adenopathy\par

## 2024-02-05 NOTE — HISTORY OF PRESENT ILLNESS
[FreeTextEntry1] : Patient is a 52 yo F here for breast cancer screening, previously seen by Dr. Persaud and ADEBAYO Wilson. S/p R US bx of 0.5cm complex cystic mass 2/13/23 yielded benign results.  She has hx of R excisional bx for FA in 2007 (age 36). She has fhx of breast cancer in maternal grandmother (age 45) and maternal great grandmother (unknown age). Patient is BRCA negative with VUS in PTEN (tested 2016). Patient denies palpable masses, skin changes, or nipple discharge bilaterally.  WILLIAM lifetime risk is 11.6%  3/21/22: B/l MG & US- scattered fibroglandular. Benign findings. JAYA. BI-RADS 2 2/3/23: B/l MG & US- scattered fibroglandular. R focal asymmetry medial 3-4FN. US- R 0.4cm complex cystic mass with surrounding echogenic tissue likely c/w MG finding 1:00 4FN (rec US bx w/ post MG). BI-RADS 4 2/13/23: R US bx 0.5cm complex cystic mass 1:00 4FN- Fibrocystic changes including apocrine metaplasia. Benign, concordant. Recc 6 month f/u R MG/US 6/9/23: R MG & US- scattered fibroglandular. R benign focal asymmetry anterior medial. R stable surgical changes. US- R 1.2cm stable bx mass 1:00 1FN. Rec annual screening. BI-RADS 2 2/5/24: B/L MG & US-scattered fibroglandular density-BI-RADS 2

## 2024-02-07 ENCOUNTER — APPOINTMENT (OUTPATIENT)
Dept: OBGYN | Facility: CLINIC | Age: 54
End: 2024-02-07
Payer: SELF-PAY

## 2024-02-07 VITALS
BODY MASS INDEX: 37.77 KG/M2 | HEART RATE: 95 BPM | SYSTOLIC BLOOD PRESSURE: 127 MMHG | OXYGEN SATURATION: 98 % | DIASTOLIC BLOOD PRESSURE: 86 MMHG | WEIGHT: 227 LBS

## 2024-02-07 DIAGNOSIS — E61.1 IRON DEFICIENCY: ICD-10-CM

## 2024-02-07 DIAGNOSIS — N95.1 MENOPAUSAL AND FEMALE CLIMACTERIC STATES: ICD-10-CM

## 2024-02-07 DIAGNOSIS — Z01.419 ENCOUNTER FOR GYNECOLOGICAL EXAMINATION (GENERAL) (ROUTINE) W/OUT ABNORMAL FINDINGS: ICD-10-CM

## 2024-02-07 DIAGNOSIS — R79.0 ABNORMAL LVL OF BLOOD MINERAL: ICD-10-CM

## 2024-02-07 DIAGNOSIS — N95.2 POSTMENOPAUSAL ATROPHIC VAGINITIS: ICD-10-CM

## 2024-02-07 DIAGNOSIS — Z90.79 ACQUIRED ABSENCE OF OTHER GENITAL ORGAN(S): ICD-10-CM

## 2024-02-07 DIAGNOSIS — N60.19 DIFFUSE CYSTIC MASTOPATHY OF UNSPECIFIED BREAST: ICD-10-CM

## 2024-02-07 DIAGNOSIS — Z72.51 HIGH RISK HETEROSEXUAL BEHAVIOR: ICD-10-CM

## 2024-02-07 PROCEDURE — 99396 PREV VISIT EST AGE 40-64: CPT

## 2024-02-07 RX ORDER — CITRULL/ARGIN/PINE XT/ROSE HIP 400-400 MG
TABLET ORAL
Qty: 30 | Refills: 2 | Status: ACTIVE | COMMUNITY
Start: 2024-02-07 | End: 1900-01-01

## 2024-02-07 NOTE — PHYSICAL EXAM
[Appropriately responsive] : appropriately responsive [Alert] : alert [No Acute Distress] : no acute distress [Clear to Auscultation B/L] : clear to auscultation bilaterally [Soft] : soft [Non-tender] : non-tender [Non-distended] : non-distended [No HSM] : No HSM [No Lesions] : no lesions [No Mass] : no mass [Oriented x3] : oriented x3 [Examination Of The Breasts] : a normal appearance [Breast Palpation Diffuse Fibrous Tissue Bilateral] : fibrocystic changes [No Masses] : no breast masses were palpable [Labia Majora] : normal [Labia Minora] : normal [Normal] : normal [Atrophy] : atrophy [Absent] : absent [Uterine Adnexae] : non-palpable

## 2024-02-15 ENCOUNTER — NON-APPOINTMENT (OUTPATIENT)
Age: 54
End: 2024-02-15

## 2024-02-15 LAB
24R-OH-CALCIDIOL SERPL-MCNC: 43.1 PG/ML
C TRACH RRNA SPEC QL NAA+PROBE: NOT DETECTED
CYTOLOGY CVX/VAG DOC THIN PREP: NORMAL
ESTRADIOL SERPL-MCNC: 22 PG/ML
FSH SERPL-MCNC: 39.5 IU/L
HBV SURFACE AG SER QL: NONREACTIVE
HCV AB SER QL: NONREACTIVE
HCV S/CO RATIO: 0.16 S/CO
HIV1+2 AB SPEC QL IA.RAPID: NONREACTIVE
HSV 1+2 IGG SER IA-IMP: NEGATIVE
HSV 1+2 IGG SER IA-IMP: POSITIVE
HSV1 IGG SER QL: 18.5 INDEX
HSV2 IGG SER QL: 0.06 INDEX
LH SERPL-ACNC: 27.3 IU/L
N GONORRHOEA RRNA SPEC QL NAA+PROBE: NOT DETECTED
PROGEST SERPL-MCNC: 0.1 NG/ML
SOURCE AMPLIFICATION: NORMAL
SOURCE TP AMPLIFICATION: NORMAL
T PALLIDUM AB SER QL IA: NEGATIVE
T VAGINALIS RRNA SPEC QL NAA+PROBE: NOT DETECTED
T4 FREE SERPL-MCNC: 1 NG/DL
TESTOST SERPL-MCNC: 4.7 NG/DL
TSH SERPL-ACNC: 1.25 UIU/ML
VIT B12 SERPL-MCNC: 747 PG/ML

## 2024-03-22 RX ORDER — CITRULL/ARGIN/PINE XT/ROSE HIP 400-400 MG
TABLET ORAL
Qty: 30 | Refills: 6 | Status: ACTIVE | COMMUNITY
Start: 2024-03-22 | End: 1900-01-01

## 2024-04-02 NOTE — HISTORY OF PRESENT ILLNESS
[Patient reported mammogram was normal] : Patient reported mammogram was normal [Patient reported breast sonogram was normal] : Patient reported breast sonogram was normal [Patient reported PAP Smear was normal] : Patient reported PAP Smear was normal [Post-Menopause, No Sxs] : post-menopausal, currently without symptoms [No] : Patient does not have concerns regarding sex [FreeTextEntry1] : Ms. LazarusMae diaz 52 yo female presents for GYN exams.  [Mammogramdate] : 02/2024 [BreastSonogramDate] : 02/2024 [PapSmeardate] : 02/2023 [BoneDensityDate] : 03/2022

## 2025-02-07 ENCOUNTER — NON-APPOINTMENT (OUTPATIENT)
Age: 55
End: 2025-02-07

## 2025-02-10 ENCOUNTER — NON-APPOINTMENT (OUTPATIENT)
Age: 55
End: 2025-02-10

## 2025-02-10 ENCOUNTER — APPOINTMENT (OUTPATIENT)
Dept: BREAST CENTER | Facility: CLINIC | Age: 55
End: 2025-02-10
Payer: COMMERCIAL

## 2025-02-10 VITALS
HEIGHT: 65 IN | WEIGHT: 169 LBS | BODY MASS INDEX: 28.16 KG/M2 | OXYGEN SATURATION: 98 % | DIASTOLIC BLOOD PRESSURE: 65 MMHG | HEART RATE: 91 BPM | SYSTOLIC BLOOD PRESSURE: 101 MMHG

## 2025-02-10 DIAGNOSIS — N60.19 DIFFUSE CYSTIC MASTOPATHY OF UNSPECIFIED BREAST: ICD-10-CM

## 2025-02-10 PROCEDURE — 99213 OFFICE O/P EST LOW 20 MIN: CPT

## 2025-02-10 RX ORDER — TIRZEPATIDE 15 MG/.5ML
15 INJECTION, SOLUTION SUBCUTANEOUS
Refills: 0 | Status: ACTIVE | COMMUNITY

## 2025-02-12 ENCOUNTER — APPOINTMENT (OUTPATIENT)
Dept: OBGYN | Facility: CLINIC | Age: 55
End: 2025-02-12

## 2025-02-12 ENCOUNTER — NON-APPOINTMENT (OUTPATIENT)
Age: 55
End: 2025-02-12

## 2025-02-12 VITALS — HEART RATE: 99 BPM | DIASTOLIC BLOOD PRESSURE: 68 MMHG | OXYGEN SATURATION: 97 % | SYSTOLIC BLOOD PRESSURE: 99 MMHG

## 2025-02-12 DIAGNOSIS — Z01.419 ENCOUNTER FOR GYNECOLOGICAL EXAMINATION (GENERAL) (ROUTINE) W/OUT ABNORMAL FINDINGS: ICD-10-CM

## 2025-02-12 DIAGNOSIS — R32 UNSPECIFIED URINARY INCONTINENCE: ICD-10-CM

## 2025-02-12 PROCEDURE — 99459 PELVIC EXAMINATION: CPT

## 2025-02-12 PROCEDURE — 99396 PREV VISIT EST AGE 40-64: CPT

## 2025-02-19 ENCOUNTER — NON-APPOINTMENT (OUTPATIENT)
Age: 55
End: 2025-02-19

## 2025-02-19 LAB
CYTOLOGY CVX/VAG DOC THIN PREP: NORMAL
HPV HIGH+LOW RISK DNA PNL CVX: NOT DETECTED

## 2025-03-13 ENCOUNTER — APPOINTMENT (OUTPATIENT)
Dept: UROGYNECOLOGY | Facility: CLINIC | Age: 55
End: 2025-03-13
Payer: COMMERCIAL

## 2025-03-13 VITALS
DIASTOLIC BLOOD PRESSURE: 72 MMHG | OXYGEN SATURATION: 98 % | SYSTOLIC BLOOD PRESSURE: 103 MMHG | WEIGHT: 173 LBS | HEART RATE: 92 BPM | BODY MASS INDEX: 28.79 KG/M2

## 2025-03-13 DIAGNOSIS — N95.2 POSTMENOPAUSAL ATROPHIC VAGINITIS: ICD-10-CM

## 2025-03-13 DIAGNOSIS — N32.81 OVERACTIVE BLADDER: ICD-10-CM

## 2025-03-13 PROCEDURE — 99203 OFFICE O/P NEW LOW 30 MIN: CPT | Mod: 25

## 2025-03-13 PROCEDURE — 81002 URINALYSIS NONAUTO W/O SCOPE: CPT

## 2025-03-13 PROCEDURE — 51701 INSERT BLADDER CATHETER: CPT

## 2025-03-13 PROCEDURE — 99459 PELVIC EXAMINATION: CPT

## 2025-03-13 RX ORDER — ESTRADIOL 10 UG/1
10 TABLET, FILM COATED VAGINAL
Qty: 25 | Refills: 3 | Status: ACTIVE | COMMUNITY
Start: 2025-03-13 | End: 1900-01-01

## 2025-03-16 LAB
BACTERIA UR CULT: NORMAL
BILIRUB UR QL STRIP: NEGATIVE
GLUCOSE UR-MCNC: NEGATIVE
HCG UR QL: 0.2 EU/DL
HGB UR QL STRIP.AUTO: NEGATIVE
KETONES UR-MCNC: NEGATIVE
LEUKOCYTE ESTERASE UR QL STRIP: NEGATIVE
NITRITE UR QL STRIP: NEGATIVE
PH UR STRIP: 6
PROT UR STRIP-MCNC: NEGATIVE
SP GR UR STRIP: 1

## 2025-05-30 NOTE — PRE-OP CHECKLIST - TAMPON REMOVED
Peripheral Block    Date/Time: 5/30/2025 9:10 AM    Performed by: Ayana Palma M.D.  Authorized by: Ayana Palma M.D.    Patient Location:  Pre-op  Start Time:  5/30/2025 9:10 AM  Reason for Block: at surgeon's request and post-op pain management ONLY    patient identified, IV checked, site marked, risks and benefits discussed, surgical consent, monitors and equipment checked, pre-op evaluation and timeout performed    Patient Position:  Supine  Prep: ChloraPrep    Monitoring:  Heart rate, continuous pulse ox and cardiac monitor  Block Region:  Upper Extremity  Upper Extremity - Block Type:  BRACHIAL PLEXUS block, Interscalene approach    Laterality:  Right  Procedures: ultrasound guided  Image captured, interpreted and electronically stored.  Local Infiltration:  Lidocaine  Strength:  1 %  Dose:  3 ml  Block Type:  Single-shot  Needle Length:  25mm  Needle Gauge:  24 G  Needle Localization:  Ultrasound guidance  Ultrasound picture in chart  Injection Assessment:  Negative aspiration for heme, no paresthesia on injection, incremental injection and local visualized surrounding nerve on ultrasound  Evidence of intravascular injection: No         n/a